# Patient Record
Sex: MALE | Race: WHITE | NOT HISPANIC OR LATINO | URBAN - METROPOLITAN AREA
[De-identification: names, ages, dates, MRNs, and addresses within clinical notes are randomized per-mention and may not be internally consistent; named-entity substitution may affect disease eponyms.]

---

## 2018-11-26 ENCOUNTER — OUTPATIENT (OUTPATIENT)
Dept: OUTPATIENT SERVICES | Facility: HOSPITAL | Age: 68
LOS: 1 days | Discharge: HOME | End: 2018-11-26
Payer: MEDICARE

## 2018-11-26 ENCOUNTER — APPOINTMENT (OUTPATIENT)
Dept: RADIATION ONCOLOGY | Facility: CLINIC | Age: 68
End: 2018-11-26
Payer: MEDICARE

## 2018-11-26 VITALS
RESPIRATION RATE: 16 BRPM | BODY MASS INDEX: 23.34 KG/M2 | WEIGHT: 181.88 LBS | HEART RATE: 74 BPM | HEIGHT: 74 IN | SYSTOLIC BLOOD PRESSURE: 143 MMHG | OXYGEN SATURATION: 98 % | DIASTOLIC BLOOD PRESSURE: 81 MMHG | TEMPERATURE: 98.78 F

## 2018-11-26 PROCEDURE — 99204 OFFICE O/P NEW MOD 45 MIN: CPT | Mod: 25

## 2018-11-26 PROCEDURE — 77263 THER RADIOLOGY TX PLNG CPLX: CPT

## 2018-12-11 ENCOUNTER — RESULT REVIEW (OUTPATIENT)
Age: 68
End: 2018-12-11

## 2018-12-11 PROCEDURE — 88321 CONSLTJ&REPRT SLD PREP ELSWR: CPT

## 2018-12-18 NOTE — PROCEDURE
[FreeTextEntry3] : 22.5mg IM on right buttock given today. Pt tolerated well. Advised to call back with any concerns and questions. Exp date 6/4/2021. Lot # [id number]

## 2019-01-16 PROCEDURE — 77301 RADIOTHERAPY DOSE PLAN IMRT: CPT | Mod: 26

## 2019-01-16 PROCEDURE — 77338 DESIGN MLC DEVICE FOR IMRT: CPT | Mod: 26

## 2019-01-16 PROCEDURE — 77300 RADIATION THERAPY DOSE PLAN: CPT | Mod: 26

## 2019-01-18 PROCEDURE — 77470 SPECIAL RADIATION TREATMENT: CPT | Mod: 26

## 2019-02-04 ENCOUNTER — OTHER (OUTPATIENT)
Age: 69
End: 2019-02-04

## 2019-02-04 PROCEDURE — 77387B: CUSTOM | Mod: 26

## 2019-02-04 NOTE — HISTORY OF PRESENT ILLNESS
[FreeTextEntry1] : Mr Mccarty  is a 67 yo male s/p RP in 2009 for adenocarcinoma of the prostate.  He [place] 1[place]29/2009 [place] [facility] [place] PSA started rising few years after surgery. Has been doing well until recent rise in PSA now to 0.76. He is being seen for salvage therapy. \par He Received first dose of Lupron 22.5mg IM on 12/21/2018\par \par He presents today for on treatment visit. He completed 180/7020cGY. He report nocturia 3-5 times per night, urgency, and occasional dribbling. Has IBS and diverticulitis, endorsed constipation. Takes Trulans daily. Patient has erection dysfunction and not sexually active. He denies bone or groin pain\par

## 2019-02-04 NOTE — REVIEW OF SYSTEMS
[Urinary Urgency: Grade 1 - Present] : Urinary Urgency: Grade 1 - Present [Urinary Frequency: Grade 1 - Present] : Urinary Frequency: Grade 1 - Present

## 2019-02-06 PROCEDURE — 77387B: CUSTOM | Mod: 26

## 2019-02-07 PROCEDURE — 77387B: CUSTOM | Mod: 26

## 2019-02-08 PROCEDURE — 77387B: CUSTOM | Mod: 26

## 2019-02-11 ENCOUNTER — OTHER (OUTPATIENT)
Age: 69
End: 2019-02-11

## 2019-02-11 PROCEDURE — 77387B: CUSTOM | Mod: 26

## 2019-02-11 PROCEDURE — 77427 RADIATION TX MANAGEMENT X5: CPT

## 2019-02-11 NOTE — HISTORY OF PRESENT ILLNESS
[FreeTextEntry1] : Mr Mccarty  is a 67 yo male s/p RP in 2009 for adenocarcinoma of the prostate.  He [place] 1[place]29/2009 [place] [facility] [place] PSA started rising few years after surgery. Has been doing well until recent rise in PSA now to 0.76. He is being seen for salvage therapy. \par He received first dose of Lupron 22.5mg IM on 12/21/2018\par \par He presents today for on treatment visit. He completed xxx/7020cGY. He report stable chronic nocturia, urgency, and dribbling. Has IBS and diverticulitis, endorsed stable, chronic constipation. Takes Trulans daily. Patient has erection dysfunction and is not sexually active. He denies bone or groin pain.\par

## 2019-02-12 PROCEDURE — 77387B: CUSTOM | Mod: 26

## 2019-02-13 PROCEDURE — 77387B: CUSTOM | Mod: 26

## 2019-02-14 PROCEDURE — 77387B: CUSTOM | Mod: 26

## 2019-02-15 PROCEDURE — 77387B: CUSTOM | Mod: 26

## 2019-02-19 PROCEDURE — 77387B: CUSTOM | Mod: 26

## 2019-02-19 PROCEDURE — 77427 RADIATION TX MANAGEMENT X5: CPT

## 2019-02-19 NOTE — HISTORY OF PRESENT ILLNESS
[FreeTextEntry1] : Mr Mccarty  is a 69 yo male s/p RP in 2009 for adenocarcinoma of the prostate.  He underwent radical prostatectomy on 1/29/2009 with Dr. Jacobson at [facility] [place]. His PSA started rising few years after surgery. Has been doing well until recent rise in PSA now to 0.76. He is being seen for salvage therapy. \par He received first dose of Lupron 22.5mg IM on 12/21/2018\par \par He presents today for on treatment visit. He completed 1800/7020cGY. He report stable fatigue, chronic nocturia, urgency, and dribbling, insomnia . Has IBS and diverticulitis, endorsed stable, chronic constipation. Takes Trulans daily. Patient has erection dysfunction and is not sexually active. He denies bone or groin pain.\par He reported severe Nausea that necessitated ER visit on  Sat to [facility], CT of the abdomen showed no obstruction as per patient and Tx with Tylenol for neck pain. Reports last colonoscopy was 3-4 yrs ago.  Will see GI Dr Bowden this Thursday. Reports HX of Hand tremors at rest getting worse, has appt with Neurology on March 15th.

## 2019-02-20 PROCEDURE — 77387B: CUSTOM | Mod: 26

## 2019-02-21 ENCOUNTER — OTHER (OUTPATIENT)
Age: 69
End: 2019-02-21

## 2019-02-21 PROCEDURE — 77387B: CUSTOM | Mod: 26

## 2019-02-22 PROCEDURE — 77387B: CUSTOM | Mod: 26

## 2019-02-25 PROCEDURE — 77387B: CUSTOM | Mod: 26

## 2019-02-25 NOTE — HISTORY OF PRESENT ILLNESS
[FreeTextEntry1] : Mr Mccarty  is a 69 yo male s/p RP in 2009 for adenocarcinoma of the prostate.  He underwent radical prostatectomy on 1/29/2009 with Dr. Jacobson at [facility] [place]. His PSA started rising few years after surgery. Has been doing well until recent rise in PSA now to 0.76. He is being seen for salvage therapy. \par He received first dose of Lupron 22.5mg IM on 12/21/2018\par \par He presents today for on treatment visit. He completed 2520/7020cGY. He report stable fatigue, chronic nocturia, and dribbling, insomnia. Patient has urinary urgency . Has IBS and diverticulitis, endorsed stable, chronic constipation. Takes Trulans daily. \par

## 2019-02-26 PROCEDURE — 77427 RADIATION TX MANAGEMENT X5: CPT

## 2019-02-26 PROCEDURE — 77387B: CUSTOM | Mod: 26

## 2019-02-27 PROCEDURE — 77387B: CUSTOM | Mod: 26

## 2019-02-28 PROCEDURE — 77387B: CUSTOM | Mod: 26

## 2019-03-01 PROCEDURE — 77387B: CUSTOM | Mod: 26

## 2019-03-04 PROCEDURE — 77387B: CUSTOM | Mod: 26

## 2019-03-04 NOTE — HISTORY OF PRESENT ILLNESS
[FreeTextEntry1] : Mr Mccarty  is a 69 yo male s/p RP in 2009 for adenocarcinoma of the prostate.  He [place] on 1/29/2009 [place] [facility] [place] His PSA started rising few years after surgery. Has been doing well until recent rise in PSA now to 0.76. He is being seen for salvage therapy. \par He received first dose of Lupron 22.5mg IM on 12/21/2018\par \par He presents today for on treatment visit. He completed 3243/7020cGY. He report stable fatigue, chronic nocturia, and dribbling, insomnia. Patient has urinary urgency . Has IBS and diverticulitis, endorsed stable, chronic constipation. Takes Trulans daily. \par

## 2019-03-05 PROCEDURE — 77387B: CUSTOM | Mod: 26

## 2019-03-05 PROCEDURE — 77427 RADIATION TX MANAGEMENT X5: CPT

## 2019-03-06 PROCEDURE — 77387B: CUSTOM | Mod: 26

## 2019-03-07 ENCOUNTER — APPOINTMENT (OUTPATIENT)
Dept: OPHTHALMOLOGY | Facility: CLINIC | Age: 69
End: 2019-03-07
Payer: MEDICARE

## 2019-03-07 DIAGNOSIS — H52.203 MYOPIA, BILATERAL: ICD-10-CM

## 2019-03-07 DIAGNOSIS — H52.4 MYOPIA, BILATERAL: ICD-10-CM

## 2019-03-07 DIAGNOSIS — H52.13 MYOPIA, BILATERAL: ICD-10-CM

## 2019-03-07 DIAGNOSIS — H25.813 COMBINED FORMS OF AGE-RELATED CATARACT, BILATERAL: ICD-10-CM

## 2019-03-07 DIAGNOSIS — H50.9 UNSPECIFIED STRABISMUS: ICD-10-CM

## 2019-03-07 DIAGNOSIS — H40.003 PREGLAUCOMA, UNSPECIFIED, BILATERAL: ICD-10-CM

## 2019-03-07 PROCEDURE — 92004 COMPRE OPH EXAM NEW PT 1/>: CPT

## 2019-03-07 PROCEDURE — 92015 DETERMINE REFRACTIVE STATE: CPT

## 2019-03-07 PROCEDURE — 92020 GONIOSCOPY: CPT

## 2019-03-07 PROCEDURE — 92133 CPTRZD OPH DX IMG PST SGM ON: CPT

## 2019-03-07 PROCEDURE — 77387B: CUSTOM | Mod: 26

## 2019-03-08 ENCOUNTER — OTHER (OUTPATIENT)
Age: 69
End: 2019-03-08

## 2019-03-08 PROBLEM — H52.13 MYOPIA OF BOTH EYES WITH ASTIGMATISM AND PRESBYOPIA: Status: ACTIVE | Noted: 2019-03-08

## 2019-03-08 PROCEDURE — 77387B: CUSTOM | Mod: 26

## 2019-03-11 PROCEDURE — 77387B: CUSTOM | Mod: 26

## 2019-03-11 NOTE — HISTORY OF PRESENT ILLNESS
[FreeTextEntry1] : Mr Mccarty  is a 67 yo male s/p RP in 2009 for adenocarcinoma of the prostate.  He underwent radical prostatectomy on 1/29/2009 with Dr. Jacobson at [facility] [place] [place]. His PSA started rising few years after surgery. Has been doing well until recent rise in PSA now to 0.76. He is being seen for salvage therapy. \par He received first dose of Lupron 22.5mg IM on 12/21/2018\par \par He presents today for on treatment visit. He completed 4320/7020cGY. He report stable fatigue, chronic nocturia, and dribbling, insomnia. Patient has urinary urgency and frequency . Has IBS and diverticulitis, endorsed stable, chronic constipation. Takes Trulans daily. \par

## 2019-03-13 PROCEDURE — 77387B: CUSTOM | Mod: 26

## 2019-03-13 PROCEDURE — 77427 RADIATION TX MANAGEMENT X5: CPT

## 2019-03-14 PROCEDURE — 77387B: CUSTOM | Mod: 26

## 2019-03-15 PROCEDURE — 77387B: CUSTOM | Mod: 26

## 2019-03-18 ENCOUNTER — APPOINTMENT (OUTPATIENT)
Dept: NEUROLOGY | Facility: CLINIC | Age: 69
End: 2019-03-18
Payer: MEDICARE

## 2019-03-18 VITALS
BODY MASS INDEX: 23.1 KG/M2 | DIASTOLIC BLOOD PRESSURE: 81 MMHG | HEART RATE: 78 BPM | WEIGHT: 180 LBS | SYSTOLIC BLOOD PRESSURE: 135 MMHG | HEIGHT: 74 IN

## 2019-03-18 DIAGNOSIS — R51 HEADACHE: ICD-10-CM

## 2019-03-18 PROCEDURE — 99205 OFFICE O/P NEW HI 60 MIN: CPT

## 2019-03-18 PROCEDURE — 77387B: CUSTOM | Mod: 26

## 2019-03-18 RX ORDER — IBUPROFEN 600 MG/1
600 TABLET, FILM COATED ORAL 3 TIMES DAILY
Qty: 90 | Refills: 0 | Status: ACTIVE | COMMUNITY
Start: 2019-03-18

## 2019-03-18 NOTE — HISTORY OF PRESENT ILLNESS
[FreeTextEntry1] : Mr Mccarty  is a 67 yo male s/p RP in 2009 for adenocarcinoma of the prostate.  He underwent radical prostatectomy on 1/29/2009 with Dr. Jacobson at [facility] [place]. His PSA started rising few years after surgery. Has been doing well until recent rise in PSA now to 0.76. He is being seen for salvage therapy. \par He received first dose of Lupron 22.5mg IM on 12/21/2018\par \par He presents today for on treatment visit. He completed 5040/7020cGY. He report stable fatigue, chronic nocturia, and dribbling, insomnia. Patient has urinary urgency and frequency . Has IBS and diverticulitis, endorsed stable, chronic constipation. Takes Trulans daily. \par

## 2019-03-18 NOTE — REVIEW OF SYSTEMS
[Urinary Frequency: Grade 1 - Present] : Urinary Frequency: Grade 1 - Present [Urinary Urgency: Grade 1 - Present] : Urinary Urgency: Grade 1 - Present

## 2019-03-19 PROCEDURE — 77387B: CUSTOM | Mod: 26

## 2019-03-20 PROCEDURE — 77427 RADIATION TX MANAGEMENT X5: CPT

## 2019-03-20 PROCEDURE — 77387B: CUSTOM | Mod: 26

## 2019-03-21 PROCEDURE — 77387B: CUSTOM | Mod: 26

## 2019-03-22 ENCOUNTER — FORM ENCOUNTER (OUTPATIENT)
Age: 69
End: 2019-03-22

## 2019-03-22 PROCEDURE — 77387B: CUSTOM | Mod: 26

## 2019-03-23 ENCOUNTER — APPOINTMENT (OUTPATIENT)
Dept: RADIOLOGY | Facility: CLINIC | Age: 69
End: 2019-03-23
Payer: MEDICARE

## 2019-03-23 ENCOUNTER — OUTPATIENT (OUTPATIENT)
Dept: OUTPATIENT SERVICES | Facility: HOSPITAL | Age: 69
LOS: 1 days | End: 2019-03-23
Payer: MEDICARE

## 2019-03-23 DIAGNOSIS — R51 HEADACHE: ICD-10-CM

## 2019-03-23 PROCEDURE — 73030 X-RAY EXAM OF SHOULDER: CPT

## 2019-03-23 PROCEDURE — 73030 X-RAY EXAM OF SHOULDER: CPT | Mod: 26,50

## 2019-03-23 PROCEDURE — 72050 X-RAY EXAM NECK SPINE 4/5VWS: CPT

## 2019-03-23 PROCEDURE — 72050 X-RAY EXAM NECK SPINE 4/5VWS: CPT | Mod: 26

## 2019-03-24 NOTE — HISTORY OF PRESENT ILLNESS
[FreeTextEntry1] : This is a 68-year-old left-handed man who has had progressively worsening neck pain over the past several years. [facility] who informed him that MRI Cervical Spine results were normal and that he should pursue physical therapy. He instead went to [facility] and saw a pain management specialist, Dr. Bernardo Degroot, who performed an epidural steroid injection, although this did not help. He saw two other pain management specialists there, but they were not able to help either. Last year, he underwent a course of physical therapy and chiropracty with Dr. Jesus Damon at [facility] as well as acupuncture, but they did not help.\par \par The pain in the neck feels intense and deep, and radiates upward along the midline of the scalp to the forehead, on a daily basis. When this occurs, the patient has difficulty moving his head to either side, and difficulty maintaining a  with his left hand. The pain is exacerbated by exercising (usually the next day) and by reading items on his cell phone or computer - sometimes exacerbated by bright lights and loud sounds. There are no relieving factors. The headaches are associated with a hissing sound in both ears, worse on the left, as well as with blurry vision with both eyes. The pain is rated at 8/10 at its worst.

## 2019-03-24 NOTE — PHYSICAL EXAM
[General Appearance - Alert] : alert [General Appearance - In No Acute Distress] : in no acute distress [General Appearance - Well Nourished] : well nourished [General Appearance - Well Developed] : well developed [Oriented To Time, Place, And Person] : oriented to person, place, and time [Impaired Insight] : insight and judgment were intact [Affect] : the affect was normal [Person] : oriented to person [Place] : oriented to place [Time] : oriented to time [Concentration Intact] : normal concentrating ability [Naming Objects] : no difficulty naming common objects [Repeating Phrases] : no difficulty repeating a phrase [Fluency] : fluency intact [Comprehension] : comprehension intact [Cranial Nerves Optic (II)] : visual acuity intact bilaterally,  visual fields full to confrontation, pupils equal round and reactive to light [Cranial Nerves Oculomotor (III)] : extraocular motion intact [Cranial Nerves Trigeminal (V)] : facial sensation intact symmetrically [Cranial Nerves Facial (VII)] : face symmetrical [Cranial Nerves Vestibulocochlear (VIII)] : hearing was intact bilaterally [Cranial Nerves Glossopharyngeal (IX)] : tongue and palate midline [Cranial Nerves Hypoglossal (XII)] : there was no tongue deviation with protrusion [CNS Accessory - Diminished Shoulder Elev. - Right Trapezius] : weakness of shoulder elevation was present on the right [CNS Accessory - Diminished Shoulder Elev. - Left Trapezius] : no weakness of shoulder elevation on the left [CNS Accessory - Sternocleidomastoid Weakness Right Only] : sternocleidomastoid weakness was present on the right [CNS Accessory - Sternocleidomastoid Weakness Left Only] : no sternocleidomastoid weakness on the left [Motor Tone] : muscle tone was normal in all four extremities [Motor Strength] : muscle strength was normal in all four extremities [Involuntary Movements] : no involuntary movements were seen [No Muscle Atrophy] : normal bulk in all four extremities [Motor Handedness Right-Handed] : the patient is right hand dominant [Paresis Pronator Drift Right-Sided] : no pronator drift on the right [Paresis Pronator Drift Left-Sided] : no pronator drift on the left [Motor Strength Upper Extremities Bilaterally] : strength was normal in both upper extremities [Motor Strength Lower Extremities Bilaterally] : strength was normal in both lower extremities [Sensation Tactile Decrease] : light touch was intact [Sensation Pain / Temperature Decrease] : pain and temperature was intact [Romberg's Sign] : Romberg's sign was negtive [Allodynia] : no ~T allodynia present [Balance] : balance was intact [Past-pointing] : there was no past-pointing [Tremor] : no tremor present [Dysdiadochokinesia Bilaterally] : not present [Coordination - Dysmetria Impaired Finger-to-Nose Bilateral] : not present [Coordination - Dysmetria Impaired Heel-to-Shin Bilateral] : not present [2+] : Ankle jerk left 2+ [Plantar Reflex Right Only] : normal on the right [Plantar Reflex Left Only] : normal on the left [___] : absent on the right [___] : absent on the left [FreeTextEntry8] : Normal, narrow-based gait. No difficulty with tiptoe, heel, and tandem gaits. [Sclera] : the sclera and conjunctiva were normal [PERRL With Normal Accommodation] : pupils were equal in size, round, reactive to light, with normal accommodation [Extraocular Movements] : extraocular movements were intact [Outer Ear] : the ears and nose were normal in appearance [Oropharynx] : the oropharynx was normal [Neck Appearance] : the appearance of the neck was normal [Auscultation Breath Sounds / Voice Sounds] : lungs were clear to auscultation bilaterally [Heart Rate And Rhythm] : heart rate was normal and rhythm regular [Heart Sounds] : normal S1 and S2 [Murmurs] : no murmurs [Arterial Pulses Carotid] : carotid pulses were normal with no bruits [Bowel Sounds] : normal bowel sounds [Abdomen Soft] : soft [Abdomen Tenderness] : non-tender [No CVA Tenderness] : no ~M costovertebral angle tenderness [FreeTextEntry1] : Mild tenderness to palpation at cervical spine, but not more distally [Abnormal Walk] : normal gait [Nail Clubbing] : no clubbing  or cyanosis of the fingernails [Skin Color & Pigmentation] : normal skin color and pigmentation [Skin Turgor] : normal skin turgor [] : no rash

## 2019-03-24 NOTE — REVIEW OF SYSTEMS
[Neck Pain] : neck pain [As Noted in HPI] : as noted in HPI [Negative] : Heme/Lymph [FreeTextEntry3] : Blurry vision associated with some headaches [FreeTextEntry4] : Hissing sound associated with some headaches

## 2019-03-25 PROCEDURE — 77387B: CUSTOM | Mod: 26

## 2019-03-26 PROCEDURE — 77387B: CUSTOM | Mod: 26

## 2019-03-27 ENCOUNTER — APPOINTMENT (OUTPATIENT)
Dept: OPHTHALMOLOGY | Facility: CLINIC | Age: 69
End: 2019-03-27

## 2019-03-27 PROCEDURE — 77387B: CUSTOM | Mod: 26

## 2019-03-28 PROCEDURE — 77387B: CUSTOM | Mod: 26

## 2019-03-29 PROCEDURE — 77387B: CUSTOM | Mod: 26

## 2019-04-01 PROCEDURE — 77387B: CUSTOM | Mod: 26

## 2019-04-02 PROCEDURE — 77387B: CUSTOM | Mod: 26

## 2019-04-02 PROCEDURE — 77427 RADIATION TX MANAGEMENT X5: CPT

## 2019-04-02 NOTE — HISTORY OF PRESENT ILLNESS
[FreeTextEntry1] : Mr Mccarty  is a 67 yo male s/p RP in 2009 for adenocarcinoma of the prostate.  He underwent radical prostatectomy on 1/29/2009 with Dr. Jacobson at [facility] [place] [facility] [place]. His PSA started rising few years after surgery. Has been doing well until recent rise in PSA now to 0.76. He is being seen for salvage therapy. \par He received first dose of Lupron 22.5mg IM on 12/21/2018\par \par He presents today for his last treatment visit. He completed 7020/7020cGY. He report stable fatigue, chronic nocturia, and dribbling, insomnia. Patient has stable urinary urgency and frequency . Taking Flomax. \par

## 2019-05-02 ENCOUNTER — APPOINTMENT (OUTPATIENT)
Dept: RADIATION ONCOLOGY | Facility: CLINIC | Age: 69
End: 2019-05-02
Payer: MEDICARE

## 2019-05-02 VITALS
TEMPERATURE: 98.06 F | DIASTOLIC BLOOD PRESSURE: 80 MMHG | HEART RATE: 80 BPM | WEIGHT: 188.05 LBS | OXYGEN SATURATION: 98 % | BODY MASS INDEX: 24.13 KG/M2 | HEIGHT: 74 IN | RESPIRATION RATE: 16 BRPM | SYSTOLIC BLOOD PRESSURE: 127 MMHG

## 2019-05-02 PROCEDURE — 99024 POSTOP FOLLOW-UP VISIT: CPT

## 2019-05-02 NOTE — HISTORY OF PRESENT ILLNESS
[FreeTextEntry1] : Mr. Mccarty is a 68 years old male with adenocarcinoma of the prostate. He is s/p radical prostatectomy on 1/29/2009 with Dr. Jacobson at [facility]. His PSA started rising few years after surgery. He was treated with salvage radiation therapy to the prostate bed to a dose of 7020 cGy between 2/4/19 - 4/2/19. Patient started and completed radiation treatment well. He did not develop any grade 3 or higher acute toxicity or taking any unscheduled treatment break.\par \par He present here today for post treatment follow up. Patient report nocturia 5-6 times per night , occasional dribbling and urgency. He denies dysuria, hematuria and hesitancy. Has occasional constipation. Patient is not sexually active. Patient stated he feel depressed. He is being followed by our .

## 2019-05-02 NOTE — REVIEW OF SYSTEMS
[Negative] : Neurological [Nocturia] : nocturia [IPSS Score (0-40): ___] : IPSS score: [unfilled] [EPIC-CP Score (0-60): ___] : EPIC-CP score: [unfilled] [Hematuria: Grade 0] : Hematuria: Grade 0 [Urinary Incontinence: Grade 1 - Occasional (e.g., with coughing, sneezing, etc.), pads not indicated] : Urinary Incontinence: Grade 1 - Occasional (e.g., with coughing, sneezing, etc.), pads not indicated [Urinary Tract Pain: Grade 0] : Urinary Tract Pain: Grade 0 [Urinary Retention: Grade 0] : Urinary Retention: Grade 0 [Urinary Urgency: Grade 1 - Present] : Urinary Urgency: Grade 1 - Present [Urinary Frequency: Grade 0] : Urinary Frequency: Grade 0 [Ejaculation Disorder: Grade 1 - Diminished ejaculation] : Ejaculation Disorder: Grade 1 - Diminished ejaculation  [Erectile Dysfunction: Grade 1 - Decrease in erectile function (frequency or rigidity of erections) but intervention not indicated (e.g., medication or use of mechanical device, penile pump)] : Erectile Dysfunction: Grade 1 - Decrease in erectile function (frequency or rigidity of erections) but intervention not indicated (e.g., medication or use of mechanical device, penile pump) [Fatigue] : fatigue [Depression] : depression [Hot Flashes] : hot flashes [Urinary Frequency] : no urinary frequency [Suicidal] : not suicidal [Anxiety] : no anxiety [FreeTextEntry2] : occasional dibbling

## 2019-05-02 NOTE — PHYSICAL EXAM
[Normal] : oriented to person, place and time, the affect was normal, the mood was normal and not anxious [Absent] : was absent

## 2019-05-09 ENCOUNTER — MOBILE ON CALL (OUTPATIENT)
Age: 69
End: 2019-05-09

## 2019-05-09 RX ORDER — AMITRIPTYLINE HYDROCHLORIDE 10 MG/1
10 TABLET, FILM COATED ORAL
Qty: 42 | Refills: 2 | Status: DISCONTINUED | COMMUNITY
Start: 2019-03-18 | End: 2019-05-09

## 2019-05-13 ENCOUNTER — RX RENEWAL (OUTPATIENT)
Age: 69
End: 2019-05-13

## 2019-05-13 ENCOUNTER — MOBILE ON CALL (OUTPATIENT)
Age: 69
End: 2019-05-13

## 2019-05-21 ENCOUNTER — APPOINTMENT (OUTPATIENT)
Dept: NEUROLOGY | Facility: CLINIC | Age: 69
End: 2019-05-21
Payer: MEDICARE

## 2019-05-21 VITALS
WEIGHT: 182 LBS | HEIGHT: 74 IN | SYSTOLIC BLOOD PRESSURE: 115 MMHG | BODY MASS INDEX: 23.36 KG/M2 | DIASTOLIC BLOOD PRESSURE: 71 MMHG | HEART RATE: 79 BPM

## 2019-05-21 PROCEDURE — 99215 OFFICE O/P EST HI 40 MIN: CPT

## 2019-05-21 NOTE — REVIEW OF SYSTEMS
[As Noted in HPI] : as noted in HPI [Anxiety] : anxiety [Negative] : Heme/Lymph [de-identified] : Heat intolerance episodes present

## 2019-05-21 NOTE — PHYSICAL EXAM
[General Appearance - Alert] : alert [General Appearance - In No Acute Distress] : in no acute distress [General Appearance - Well Nourished] : well nourished [General Appearance - Well Developed] : well developed [Oriented To Time, Place, And Person] : oriented to person, place, and time [Impaired Insight] : insight and judgment were intact [Person] : oriented to person [Place] : oriented to place [Time] : oriented to time [Visual Intact] : visual attention was ~T not ~L decreased [Naming Objects] : no difficulty naming common objects [Repeating Phrases] : no difficulty repeating a phrase [Fluency] : fluency intact [Comprehension] : comprehension intact [Cranial Nerves Optic (II)] : visual acuity intact bilaterally,  visual fields full to confrontation, pupils equal round and reactive to light [Cranial Nerves Oculomotor (III)] : extraocular motion intact [Cranial Nerves Trigeminal (V)] : facial sensation intact symmetrically [Cranial Nerves Facial (VII)] : face symmetrical [Cranial Nerves Vestibulocochlear (VIII)] : hearing was intact bilaterally [Cranial Nerves Glossopharyngeal (IX)] : tongue and palate midline [Cranial Nerves Hypoglossal (XII)] : there was no tongue deviation with protrusion [CNS Accessory - Diminished Shoulder Elev. - Right Trapezius] : weakness of shoulder elevation was present on the right [CNS Accessory - Sternocleidomastoid Weakness Right Only] : sternocleidomastoid weakness was present on the right [Motor Tone] : muscle tone was normal in all four extremities [Motor Strength] : muscle strength was normal in all four extremities [Involuntary Movements] : no involuntary movements were seen [No Muscle Atrophy] : normal bulk in all four extremities [Motor Handedness Right-Handed] : the patient is right hand dominant [Sensation Tactile Decrease] : light touch was intact [Sensation Pain / Temperature Decrease] : pain and temperature was intact [Balance] : balance was intact [2+] : Ankle jerk left 2+ [Sclera] : the sclera and conjunctiva were normal [PERRL With Normal Accommodation] : pupils were equal in size, round, reactive to light, with normal accommodation [Extraocular Movements] : extraocular movements were intact [Outer Ear] : the ears and nose were normal in appearance [Oropharynx] : the oropharynx was normal [Neck Appearance] : the appearance of the neck was normal [Auscultation Breath Sounds / Voice Sounds] : lungs were clear to auscultation bilaterally [Heart Rate And Rhythm] : heart rate was normal and rhythm regular [Heart Sounds] : normal S1 and S2 [Murmurs] : no murmurs [Arterial Pulses Carotid] : carotid pulses were normal with no bruits [Full Pulse] : the pedal pulses are present [Edema] : there was no peripheral edema [Bowel Sounds] : normal bowel sounds [Abdomen Soft] : soft [Abdomen Tenderness] : non-tender [No CVA Tenderness] : no ~M costovertebral angle tenderness [Abnormal Walk] : normal gait [Nail Clubbing] : no clubbing  or cyanosis of the fingernails [Skin Color & Pigmentation] : normal skin color and pigmentation [Skin Turgor] : normal skin turgor [] : no rash [CNS Accessory - Diminished Shoulder Elev. - Left Trapezius] : no weakness of shoulder elevation on the left [CNS Accessory - Sternocleidomastoid Weakness Left Only] : no sternocleidomastoid weakness on the left [Paresis Pronator Drift Right-Sided] : no pronator drift on the right [Paresis Pronator Drift Left-Sided] : no pronator drift on the left [Motor Strength Upper Extremities Bilaterally] : strength was normal in both upper extremities [Motor Strength Lower Extremities Bilaterally] : strength was normal in both lower extremities [Romberg's Sign] : Romberg's sign was negtive [Allodynia] : no ~T allodynia present [Past-pointing] : there was no past-pointing [Tremor] : no tremor present [Dysdiadochokinesia Bilaterally] : not present [Coordination - Dysmetria Impaired Finger-to-Nose Bilateral] : not present [Coordination - Dysmetria Impaired Heel-to-Shin Bilateral] : not present [Plantar Reflex Right Only] : normal on the right [Plantar Reflex Left Only] : normal on the left [___] : absent on the right [___] : absent on the left [FreeTextEntry7] : Sensory deficit to light touch and pinprick present at b/l aspects of upper back, just proximal to the b/l shoulders [FreeTextEntry8] : Normal, narrow-based gait. No difficulty with tiptoe, heel, and tandem gaits. [FreeTextEntry1] : Tenderness to palpation present at cervical spine

## 2019-05-21 NOTE — ASSESSMENT
[FreeTextEntry1] : 68 LHM with chronic neck pain and cervicogenic headache, now with migrainous features, with poor response to existing medical and physical therapy.

## 2019-05-21 NOTE — HISTORY OF PRESENT ILLNESS
[FreeTextEntry1] : This is a 68-year-old left-handed man with progressively worsening neck pain over the past several years, previously [facility] by [facility] and last seen by me in this clinic on March 18, 2019. The patient is seen today for urgent follow-up because of worsening of neck pain, as well as a 2-month history of developing headaches starting at the back of the head and radiating up and over to the forehead, associated with photophobia and phonophobia, making it difficult for him to drive during the day and view his computer screen. The pain level improves with the use of Excedrin, but only for a couple of hours at a time. The neck pain can still flare up into a vise-like pain, rated 8/10, 2-3 times per week. The physical therapy course that I prescribed has not seemed to help much. I prescribed him a Medrol Dose Pack 2 weeks ago, which he completed yesterday, but this has also not appeared to help much.

## 2019-06-19 ENCOUNTER — FORM ENCOUNTER (OUTPATIENT)
Age: 69
End: 2019-06-19

## 2019-06-20 ENCOUNTER — OUTPATIENT (OUTPATIENT)
Dept: OUTPATIENT SERVICES | Facility: HOSPITAL | Age: 69
LOS: 1 days | End: 2019-06-20
Payer: MEDICARE

## 2019-06-20 ENCOUNTER — APPOINTMENT (OUTPATIENT)
Dept: MRI IMAGING | Facility: CLINIC | Age: 69
End: 2019-06-20
Payer: MEDICARE

## 2019-06-20 DIAGNOSIS — Z00.8 ENCOUNTER FOR OTHER GENERAL EXAMINATION: ICD-10-CM

## 2019-06-20 PROCEDURE — 72156 MRI NECK SPINE W/O & W/DYE: CPT | Mod: 26

## 2019-06-20 PROCEDURE — A9585: CPT

## 2019-06-20 PROCEDURE — 72156 MRI NECK SPINE W/O & W/DYE: CPT

## 2019-06-25 ENCOUNTER — APPOINTMENT (OUTPATIENT)
Dept: NEUROLOGY | Facility: CLINIC | Age: 69
End: 2019-06-25

## 2019-07-15 LAB — PSA SERPL-MCNC: <0.01 NG/ML

## 2019-08-19 ENCOUNTER — NON-APPOINTMENT (OUTPATIENT)
Age: 69
End: 2019-08-19

## 2019-08-19 ENCOUNTER — APPOINTMENT (OUTPATIENT)
Age: 69
End: 2019-08-19
Payer: MEDICARE

## 2019-08-19 PROCEDURE — 92014 COMPRE OPH EXAM EST PT 1/>: CPT

## 2019-08-19 PROCEDURE — 92083 EXTENDED VISUAL FIELD XM: CPT

## 2019-08-19 PROCEDURE — 92136 OPHTHALMIC BIOMETRY: CPT

## 2019-08-19 PROCEDURE — 92020 GONIOSCOPY: CPT

## 2019-08-26 LAB
ALBUMIN SERPL ELPH-MCNC: 4.8 G/DL
ALP BLD-CCNC: 52 U/L
ALT SERPL-CCNC: 17 U/L
ANION GAP SERPL CALC-SCNC: 14 MMOL/L
AST SERPL-CCNC: 17 U/L
BASOPHILS # BLD AUTO: 0.06 K/UL
BASOPHILS NFR BLD AUTO: 0.9 %
BILIRUB SERPL-MCNC: 0.4 MG/DL
BUN SERPL-MCNC: 9 MG/DL
CALCIUM SERPL-MCNC: 9.9 MG/DL
CHLORIDE SERPL-SCNC: 101 MMOL/L
CO2 SERPL-SCNC: 22 MMOL/L
CREAT SERPL-MCNC: 0.77 MG/DL
CRP SERPL-MCNC: <0.1 MG/DL
EOSINOPHIL # BLD AUTO: 0.14 K/UL
EOSINOPHIL NFR BLD AUTO: 2.1 %
ERYTHROCYTE [SEDIMENTATION RATE] IN BLOOD BY WESTERGREN METHOD: 7 MM/HR
GLUCOSE SERPL-MCNC: 113 MG/DL
HCT VFR BLD CALC: 36.6 %
HGB BLD-MCNC: 11.8 G/DL
IMM GRANULOCYTES NFR BLD AUTO: 1.2 %
LYMPHOCYTES # BLD AUTO: 0.84 K/UL
LYMPHOCYTES NFR BLD AUTO: 12.8 %
MAGNESIUM SERPL-MCNC: 1.9 MG/DL
MAN DIFF?: NORMAL
MCHC RBC-ENTMCNC: 30.5 PG
MCHC RBC-ENTMCNC: 32.2 GM/DL
MCV RBC AUTO: 94.6 FL
MONOCYTES # BLD AUTO: 0.66 K/UL
MONOCYTES NFR BLD AUTO: 10 %
NEUTROPHILS # BLD AUTO: 4.8 K/UL
NEUTROPHILS NFR BLD AUTO: 73 %
PHOSPHATE SERPL-MCNC: 3 MG/DL
PLATELET # BLD AUTO: 260 K/UL
POTASSIUM SERPL-SCNC: 4.3 MMOL/L
PROT SERPL-MCNC: 6.9 G/DL
RBC # BLD: 3.87 M/UL
RBC # FLD: 12.8 %
SODIUM SERPL-SCNC: 137 MMOL/L
WBC # FLD AUTO: 6.58 K/UL

## 2019-08-28 ENCOUNTER — NON-APPOINTMENT (OUTPATIENT)
Age: 69
End: 2019-08-28

## 2019-08-28 ENCOUNTER — APPOINTMENT (OUTPATIENT)
Dept: OPHTHALMOLOGY | Facility: CLINIC | Age: 69
End: 2019-08-28
Payer: MEDICARE

## 2019-08-28 PROCEDURE — 92060 SENSORIMOTOR EXAMINATION: CPT

## 2019-08-28 PROCEDURE — 92014 COMPRE OPH EXAM EST PT 1/>: CPT

## 2019-08-28 PROCEDURE — 92083 EXTENDED VISUAL FIELD XM: CPT

## 2019-08-30 ENCOUNTER — MOBILE ON CALL (OUTPATIENT)
Age: 69
End: 2019-08-30

## 2019-09-05 ENCOUNTER — APPOINTMENT (OUTPATIENT)
Dept: OPHTHALMOLOGY | Facility: CLINIC | Age: 69
End: 2019-09-05

## 2019-09-09 ENCOUNTER — OUTPATIENT (OUTPATIENT)
Dept: OUTPATIENT SERVICES | Facility: HOSPITAL | Age: 69
LOS: 1 days | End: 2019-09-09
Payer: MEDICARE

## 2019-09-09 ENCOUNTER — APPOINTMENT (OUTPATIENT)
Dept: MRI IMAGING | Facility: CLINIC | Age: 69
End: 2019-09-09
Payer: MEDICARE

## 2019-09-09 DIAGNOSIS — Z00.8 ENCOUNTER FOR OTHER GENERAL EXAMINATION: ICD-10-CM

## 2019-09-09 PROCEDURE — A9585: CPT

## 2019-09-09 PROCEDURE — 70553 MRI BRAIN STEM W/O & W/DYE: CPT | Mod: 26

## 2019-09-09 PROCEDURE — 70553 MRI BRAIN STEM W/O & W/DYE: CPT

## 2019-09-09 NOTE — PHYSICAL EXAM
[Absent] : was absent [Normal] : oriented to person, place and time, the affect was normal, the mood was normal and not anxious

## 2019-09-12 ENCOUNTER — APPOINTMENT (OUTPATIENT)
Dept: RADIATION ONCOLOGY | Facility: CLINIC | Age: 69
End: 2019-09-12

## 2019-09-13 ENCOUNTER — NON-APPOINTMENT (OUTPATIENT)
Age: 69
End: 2019-09-13

## 2019-09-13 ENCOUNTER — APPOINTMENT (OUTPATIENT)
Dept: OPHTHALMOLOGY | Facility: CLINIC | Age: 69
End: 2019-09-13
Payer: MEDICARE

## 2019-09-13 PROCEDURE — 92012 INTRM OPH EXAM EST PATIENT: CPT

## 2019-09-17 NOTE — HISTORY OF PRESENT ILLNESS
[FreeTextEntry1] : Mr. Mccarty is a 68 years old male with adenocarcinoma of the prostate. He is [place] 1[place]29/2009 [place] [facility] [place] PSA started rising few years after surgery. He was treated with salvage radiation therapy to the prostate bed to a dose of 7020 cGy between 2/4/19 - 4/2/19. Patient started and completed radiation treatment well. He did not develop any grade 3 or higher acute toxicity or taking any unscheduled treatment break.\par \par At his last visit, Patient report nocturia 5-6 times per night , occasional dribbling and urgency. He denies dysuria, hematuria and hesitancy. Has occasional constipation. Patient is not sexually active. Patient stated he feel depressed. He is being followed by our .\par \par He presents for follow up today.

## 2019-09-17 NOTE — DISEASE MANAGEMENT
[c] : c [1] : T1 [0] : M0 [N/A] : Currently not applicable [Radical Prostatectomy] : Radical Prostatectomy [Radiation Therapy] : Radiation  Therapy [Treatment with radiation therapy] : Treatment with radiation therapy [EBRT] : EBRT [RadiationCompletedDate] : 4/2/19 [RadicalProstatectomyDate] : 1/29/2009 [EBRTDose] : [id number] [EBRTFractions] : 39

## 2019-09-17 NOTE — REVIEW OF SYSTEMS
[Fatigue] : fatigue [IPSS Score (0-40): ___] : IPSS score: [unfilled] [EPIC-CP Score (0-60): ___] : EPIC-CP score: [unfilled] [Depression] : depression [Hot Flashes] : hot flashes [Negative] : Neurological [Urinary Incontinence: Grade 1 - Occasional (e.g., with coughing, sneezing, etc.), pads not indicated] : Urinary Incontinence: Grade 1 - Occasional (e.g., with coughing, sneezing, etc.), pads not indicated [Hematuria: Grade 0] : Hematuria: Grade 0 [Urinary Tract Pain: Grade 0] : Urinary Tract Pain: Grade 0 [Urinary Retention: Grade 0] : Urinary Retention: Grade 0 [Urinary Frequency: Grade 0] : Urinary Frequency: Grade 0 [Urinary Urgency: Grade 1 - Present] : Urinary Urgency: Grade 1 - Present [Ejaculation Disorder: Grade 1 - Diminished ejaculation] : Ejaculation Disorder: Grade 1 - Diminished ejaculation  [Erectile Dysfunction: Grade 1 - Decrease in erectile function (frequency or rigidity of erections) but intervention not indicated (e.g., medication or use of mechanical device, penile pump)] : Erectile Dysfunction: Grade 1 - Decrease in erectile function (frequency or rigidity of erections) but intervention not indicated (e.g., medication or use of mechanical device, penile pump) [Suicidal] : not suicidal [Anxiety] : no anxiety [FreeTextEntry2] : occasional dibbling

## 2019-09-24 ENCOUNTER — APPOINTMENT (OUTPATIENT)
Dept: PAIN MANAGEMENT | Facility: CLINIC | Age: 69
End: 2019-09-24

## 2019-09-26 ENCOUNTER — APPOINTMENT (OUTPATIENT)
Dept: RADIATION ONCOLOGY | Facility: CLINIC | Age: 69
End: 2019-09-26
Payer: MEDICARE

## 2019-09-27 LAB — PSA SERPL-MCNC: <0.01 NG/ML

## 2019-10-10 ENCOUNTER — APPOINTMENT (OUTPATIENT)
Dept: RADIATION ONCOLOGY | Facility: CLINIC | Age: 69
End: 2019-10-10
Payer: MEDICARE

## 2019-10-10 VITALS
HEART RATE: 72 BPM | HEIGHT: 74 IN | RESPIRATION RATE: 16 BRPM | SYSTOLIC BLOOD PRESSURE: 122 MMHG | WEIGHT: 179.22 LBS | BODY MASS INDEX: 23 KG/M2 | DIASTOLIC BLOOD PRESSURE: 73 MMHG | OXYGEN SATURATION: 96 % | TEMPERATURE: 97.88 F

## 2019-10-10 PROCEDURE — 99213 OFFICE O/P EST LOW 20 MIN: CPT

## 2019-10-10 NOTE — DISEASE MANAGEMENT
[1] : T1 [c] : c [N/A] : Currently not applicable [0] : M0 [Radiation Therapy] : Radiation  Therapy [Radical Prostatectomy] : Radical Prostatectomy [EBRT] : EBRT [Treatment with radiation therapy] : Treatment with radiation therapy [RadicalProstatectomyDate] : 1/29/2009 [EBRTDose] : 7046 [RadiationCompletedDate] : 4/2/19 [EBRTFractions] : 39

## 2019-10-10 NOTE — REVIEW OF SYSTEMS
[Fatigue] : fatigue [Negative] : Respiratory [Hematuria: Grade 0] : Hematuria: Grade 0 [Urinary Retention: Grade 0] : Urinary Retention: Grade 0 [Urinary Incontinence: Grade 1 - Occasional (e.g., with coughing, sneezing, etc.), pads not indicated] : Urinary Incontinence: Grade 1 - Occasional (e.g., with coughing, sneezing, etc.), pads not indicated [Urinary Tract Pain: Grade 0] : Urinary Tract Pain: Grade 0 [Urinary Urgency: Grade 1 - Present] : Urinary Urgency: Grade 1 - Present [Ejaculation Disorder: Grade 1 - Diminished ejaculation] : Ejaculation Disorder: Grade 1 - Diminished ejaculation  [Erectile Dysfunction: Grade 1 - Decrease in erectile function (frequency or rigidity of erections) but intervention not indicated (e.g., medication or use of mechanical device, penile pump)] : Erectile Dysfunction: Grade 1 - Decrease in erectile function (frequency or rigidity of erections) but intervention not indicated (e.g., medication or use of mechanical device, penile pump) [Urinary Frequency: Grade 0] : Urinary Frequency: Grade 0 [Nocturia] : nocturia [IPSS Score (0-40): ___] : IPSS score: [unfilled] [EPIC-CP Score (0-60): ___] : EPIC-CP score: [unfilled] [Suicidal] : not suicidal [Urinary Frequency] : no urinary frequency [Anxiety] : no anxiety [FreeTextEntry2] : occasional dibbling

## 2019-10-10 NOTE — HISTORY OF PRESENT ILLNESS
[FreeTextEntry1] : Mr. Mccarty is a 68 years old male with adenocarcinoma of the prostate. He [place] 1[place]29/2009 [place] [facility] [place] PSA started rising few years after surgery. He was treated with salvage radiation therapy to the prostate bed to a dose of 7020 cGy between 2/4/19 - 4/2/19. Patient started and completed radiation treatment well. He did not develop any grade 3 or higher acute toxicity or taking any unscheduled treatment break.\par \par He present here today for follow up. His recent PSA is <0.01 ng/ml done on 9/26/19. Patient report nocturia 3-4 times per night, urgency and occasional dribbling. does not wear pad. He has chronic constipation, diagnosed with diverticulosis. following Gastro. Patient is not sexually active.

## 2019-10-18 ENCOUNTER — APPOINTMENT (OUTPATIENT)
Dept: OPHTHALMOLOGY | Facility: CLINIC | Age: 69
End: 2019-10-18

## 2020-02-13 ENCOUNTER — NON-APPOINTMENT (OUTPATIENT)
Age: 70
End: 2020-02-13

## 2020-02-13 ENCOUNTER — APPOINTMENT (OUTPATIENT)
Age: 70
End: 2020-02-13
Payer: MEDICARE

## 2020-02-13 PROCEDURE — 92133 CPTRZD OPH DX IMG PST SGM ON: CPT

## 2020-02-13 PROCEDURE — 92083 EXTENDED VISUAL FIELD XM: CPT

## 2020-02-13 PROCEDURE — 92012 INTRM OPH EXAM EST PATIENT: CPT

## 2020-04-02 ENCOUNTER — APPOINTMENT (OUTPATIENT)
Dept: NEUROLOGY | Facility: CLINIC | Age: 70
End: 2020-04-02
Payer: MEDICARE

## 2020-04-02 PROCEDURE — 99443: CPT

## 2020-04-03 ENCOUNTER — APPOINTMENT (OUTPATIENT)
Dept: NEUROLOGY | Facility: CLINIC | Age: 70
End: 2020-04-03

## 2020-06-11 ENCOUNTER — APPOINTMENT (OUTPATIENT)
Dept: NEUROLOGY | Facility: CLINIC | Age: 70
End: 2020-06-11
Payer: MEDICARE

## 2020-06-11 VITALS — HEART RATE: 94 BPM | SYSTOLIC BLOOD PRESSURE: 136 MMHG | DIASTOLIC BLOOD PRESSURE: 90 MMHG

## 2020-06-11 VITALS
HEART RATE: 88 BPM | DIASTOLIC BLOOD PRESSURE: 77 MMHG | SYSTOLIC BLOOD PRESSURE: 129 MMHG | HEIGHT: 70 IN | BODY MASS INDEX: 25.77 KG/M2 | WEIGHT: 180 LBS

## 2020-06-11 VITALS — TEMPERATURE: 209.12 F

## 2020-06-11 DIAGNOSIS — R20.0 ANESTHESIA OF SKIN: ICD-10-CM

## 2020-06-11 PROCEDURE — 99215 OFFICE O/P EST HI 40 MIN: CPT

## 2020-06-11 RX ORDER — LEUPROLIDE ACETATE 22.5 MG
22.5 KIT INTRAMUSCULAR
Qty: 1 | Refills: 0 | Status: DISCONTINUED | COMMUNITY
Start: 2018-11-26 | End: 2020-06-11

## 2020-06-11 RX ORDER — METHYLPREDNISOLONE 4 MG/1
4 TABLET ORAL
Qty: 1 | Refills: 0 | Status: DISCONTINUED | COMMUNITY
Start: 2019-05-09 | End: 2020-06-11

## 2020-06-11 RX ORDER — ASPIRIN/ACETAMINOPHEN/CAFFEINE 250-250-65
250-250-65 TABLET ORAL 4 TIMES DAILY
Qty: 120 | Refills: 0 | Status: ACTIVE | COMMUNITY
Start: 2020-06-11

## 2020-06-11 RX ORDER — TAMSULOSIN HYDROCHLORIDE 0.4 MG/1
0.4 CAPSULE ORAL
Qty: 90 | Refills: 3 | Status: DISCONTINUED | COMMUNITY
Start: 2019-03-11 | End: 2020-06-11

## 2020-06-11 RX ORDER — LEUPROLIDE ACETATE 22.5 MG
22.5 KIT INTRAMUSCULAR
Qty: 1 | Refills: 0 | Status: DISCONTINUED | COMMUNITY
Start: 2018-12-10 | End: 2020-06-11

## 2020-06-11 NOTE — ASSESSMENT
[FreeTextEntry1] : 69 LHM with chronic neck pain, posterior headache, and sensory deficit affecting left face, which may represent\par cervicogenic headache vs. migraine with cervical radiculopathy vs. syrinx given neuroimaging findings, with insufficient response to amitriptyline, methylprednisolone, oxycodone, tramadol, and physical therapy.

## 2020-06-11 NOTE — HISTORY OF PRESENT ILLNESS
[FreeTextEntry1] : FROM 5/21/19:\par This is a 68-year-old left-handed man with progressively worsening neck pain over the past several years, [facility] and last seen by me in this clinic on March 18, 2019. The patient is seen today for urgent follow-up because of worsening of neck pain, as well as a 2-month history of developing headaches starting at the back of the head and radiating up and over to the forehead, associated with photophobia and phonophobia, making it difficult for him to drive during the day and view his computer screen. The pain level improves with the use of Excedrin, but only for a couple of hours at a time. The neck pain can still flare up into a vise-like pain, rated 8/10, 2-3 times per week. The physical therapy course that I prescribed has not seemed to help much. I prescribed him a Medrol Dose Pack 2 weeks ago, which he completed yesterday, but this has also not appeared to help much.\par \par FROM 6/11/20:\par The patient, now 69, states that he continues to have persistent neck pain, only minimally relieved with amitriptyline 50mg PO QHS and tramadol 50mg PO BID PRN pain. He and his wife have also noticed over the past several months that the patient has tremors of the head and of the left arm, especially when trying to do activities, and this can interfere with the patient's ability to hold onto items with his left hand. He has noticed over the past 2 months that he has become more sensitive to light and smells.

## 2020-06-11 NOTE — PHYSICAL EXAM
[General Appearance - Alert] : alert [General Appearance - Well Nourished] : well nourished [Oriented To Time, Place, And Person] : oriented to person, place, and time [Person] : oriented to person [Visual Intact] : visual attention was ~T not ~L decreased [Time] : oriented to time [Place] : oriented to place [Fluency] : fluency intact [Repeating Phrases] : no difficulty repeating a phrase [Naming Objects] : no difficulty naming common objects [Cranial Nerves Optic (II)] : visual acuity intact bilaterally,  visual fields full to confrontation, pupils equal round and reactive to light [Comprehension] : comprehension intact [Cranial Nerves Oculomotor (III)] : extraocular motion intact [Cranial Nerves Vestibulocochlear (VIII)] : hearing was intact bilaterally [Cranial Nerves Facial (VII)] : face symmetrical [Cranial Nerves Glossopharyngeal (IX)] : tongue and palate midline [Cranial Nerves Hypoglossal (XII)] : there was no tongue deviation with protrusion [CNS Accessory - Diminished Shoulder Elev. - Right Trapezius] : weakness of shoulder elevation was present on the right [CNS Accessory - Sternocleidomastoid Weakness Right Only] : sternocleidomastoid weakness was present on the right [Motor Tone] : muscle tone was normal in all four extremities [Involuntary Movements] : no involuntary movements were seen [Motor Strength] : muscle strength was normal in all four extremities [No Muscle Atrophy] : normal bulk in all four extremities [Motor Handedness Right-Handed] : the patient is right hand dominant [Sensation Tactile Decrease] : light touch was intact [Sensation Pain / Temperature Decrease] : pain and temperature was intact [Balance] : balance was intact [PERRL With Normal Accommodation] : pupils were equal in size, round, reactive to light, with normal accommodation [Sclera] : the sclera and conjunctiva were normal [Extraocular Movements] : extraocular movements were intact [Outer Ear] : the ears and nose were normal in appearance [Neck Appearance] : the appearance of the neck was normal [Oropharynx] : the oropharynx was normal [Auscultation Breath Sounds / Voice Sounds] : lungs were clear to auscultation bilaterally [Heart Rate And Rhythm] : heart rate was normal and rhythm regular [Heart Sounds] : normal S1 and S2 [Murmurs] : no murmurs [Edema] : there was no peripheral edema [Full Pulse] : the pedal pulses are present [Arterial Pulses Carotid] : carotid pulses were normal with no bruits [Bowel Sounds] : normal bowel sounds [Abdomen Tenderness] : non-tender [Abdomen Soft] : soft [No CVA Tenderness] : no ~M costovertebral angle tenderness [Abnormal Walk] : normal gait [Nail Clubbing] : no clubbing  or cyanosis of the fingernails [Skin Turgor] : normal skin turgor [] : no rash [Skin Color & Pigmentation] : normal skin color and pigmentation [Facial Sensation Decrease - V1 Right Only] : normal over the forehead and anterior scalp [Facial Sensation Decrease - V2 Right Only] : normal over the side of the nose, infraorbital area, and upper lip [Facial Sensation Decrease - V3 Right Only] : normal over the chin and lower lip [Facial Sensation Decrease - V1 Left Only] : decreased over the forehead and anterior scalp [Facial Sensation Decrease - V2 Left Only] : decreased over the side of the nose, infraorbital area, and upper lip [Facial Sensation Decrease - V3 Left Only] : normal over the chin and lower lip [CNS Accessory - Diminished Shoulder Elev. - Left Trapezius] : no weakness of shoulder elevation on the left [CNS Accessory - Sternocleidomastoid Weakness Left Only] : no sternocleidomastoid weakness on the left [Motor Strength Upper Extremities Bilaterally] : strength was normal in both upper extremities [Paresis Pronator Drift Right-Sided] : no pronator drift on the right [Paresis Pronator Drift Left-Sided] : no pronator drift on the left [Motor Strength Lower Extremities Bilaterally] : strength was normal in both lower extremities [Allodynia] : no ~T allodynia present [Past-pointing] : there was no past-pointing [Romberg's Sign] : Romberg's sign was negtive [Tremor] : no tremor present [Dysdiadochokinesia Bilaterally] : not present [Coordination - Dysmetria Impaired Finger-to-Nose Bilateral] : not present [Coordination - Dysmetria Impaired Heel-to-Shin Bilateral] : not present [1+] : Ankle jerk left 1+ [Plantar Reflex Right Only] : normal on the right [Plantar Reflex Left Only] : normal on the left [___] : absent on the right [___] : absent on the left [FreeTextEntry7] : Sensory deficit to light touch and pinprick present at central upper back [FreeTextEntry8] : Normal, narrow-based gait. No difficulty with tiptoe, heel, and tandem gaits. [FreeTextEntry1] : No retropulsion, bradykinesia, or en bloc turning

## 2020-06-11 NOTE — REVIEW OF SYSTEMS
[As Noted in HPI] : as noted in HPI [Anxiety] : anxiety [Negative] : Endocrine [de-identified] : Patient reports easily getting angry and stressed due to persistent pain

## 2020-06-11 NOTE — DATA REVIEWED
[FreeTextEntry1] : MRI Cervical Spine (6/20/19):\par - Multilevel cervical spondylosis\par - Multilevel b/l neuroforaminal narrowing\par - Central canal prominence vs. syrinx\par \par MRI Prostate w/wo Gadolinium (4/11/18): Per report,\par - No evidence of disease in the pelvis

## 2020-06-17 ENCOUNTER — APPOINTMENT (OUTPATIENT)
Dept: OPHTHALMOLOGY | Facility: CLINIC | Age: 70
End: 2020-06-17

## 2020-06-23 ENCOUNTER — APPOINTMENT (OUTPATIENT)
Dept: NEUROSURGERY | Facility: CLINIC | Age: 70
End: 2020-06-23
Payer: MEDICARE

## 2020-06-23 VITALS
SYSTOLIC BLOOD PRESSURE: 128 MMHG | HEIGHT: 70 IN | DIASTOLIC BLOOD PRESSURE: 84 MMHG | BODY MASS INDEX: 25.77 KG/M2 | WEIGHT: 180 LBS | HEART RATE: 84 BPM

## 2020-06-23 VITALS — TEMPERATURE: 207.5 F

## 2020-06-23 PROCEDURE — 99203 OFFICE O/P NEW LOW 30 MIN: CPT

## 2020-06-23 RX ORDER — AMITRIPTYLINE HYDROCHLORIDE 50 MG/1
50 TABLET, FILM COATED ORAL
Qty: 90 | Refills: 1 | Status: DISCONTINUED | COMMUNITY
Start: 2019-05-09 | End: 2020-06-23

## 2020-06-23 NOTE — ASSESSMENT
[FreeTextEntry1] : 69 year old male with neck pain and possible cervical radicular pain.  Given the atypical presentation of his pain, I do recommend he undergo EMG/NCS to help delineate the exact pathology of his pain.  He will return to see me once he has completed the study so that we may review the results and discuss his further treatment options.

## 2020-06-23 NOTE — DATA REVIEWED
[de-identified] : MRI cervical spine Multilevel cervical spondylosis with exaggeration of the cervical lordosis, mild retrolisthesis of C3 on C4, C4 on \par C5, and C5 on C6, and trace degenerative grade 1 anterolisthesis of C7 on T1 on T2. \par Mild prominence of the central canal versus a small cervical cord syrinx extending from the level of C4 through \par the upper cervical spine. \par C3/4: There is moderate to severe bilateral neural foraminal narrowing, left greater than right. C4/5: There is mild to moderate spinal canal narrowing and bilateral neural foraminal narrowing, severe on the \par right and mild to moderate on the left. The left vertebral artery occupies the near entirety of the left neural \par foramen. \par C5/6: There is moderate right neural foraminal narrowing. \par C6/7: There is mild spinal canal narrowing and bilateral neural foraminal narrowing, moderate to severe on the \par left and moderate on the right.

## 2020-06-23 NOTE — HISTORY OF PRESENT ILLNESS
[5] : a current pain level of 5/10 [___ yrs] : [unfilled] year(s) ago [Neck] : neck [Shooting] : shooting [10] : a maximum pain level of 10/10 [Laying] : laying [Insomnia] : insomnia [Weakness] : weakness [PT] : PT [Medications] : medications [Injections] : injections [FreeTextEntry2] : left hand [FreeTextEntry6] : Tramadol, had one cervical epidural injection about 3-4 years ago which didn't help [FreeTextEntry4] : cannot identify

## 2020-06-23 NOTE — PHYSICAL EXAM
[General Appearance - Alert] : alert [Mood] : the mood was normal [Motor Strength] : muscle strength was normal in all four extremities [Sensation Tactile Decrease] : light touch was intact [2+] : Brachioradialis right 2+ [No Spinal Tenderness] : no spinal tenderness [] : no rash

## 2020-07-23 ENCOUNTER — APPOINTMENT (OUTPATIENT)
Dept: RADIATION ONCOLOGY | Facility: CLINIC | Age: 70
End: 2020-07-23
Payer: MEDICARE

## 2020-07-28 ENCOUNTER — APPOINTMENT (OUTPATIENT)
Dept: NEUROLOGY | Facility: CLINIC | Age: 70
End: 2020-07-28
Payer: MEDICARE

## 2020-07-28 VITALS — TEMPERATURE: 208.4 F

## 2020-07-28 PROCEDURE — 95886 MUSC TEST DONE W/N TEST COMP: CPT

## 2020-07-28 PROCEDURE — 95910 NRV CNDJ TEST 7-8 STUDIES: CPT

## 2020-08-07 ENCOUNTER — APPOINTMENT (OUTPATIENT)
Dept: NEUROSURGERY | Facility: CLINIC | Age: 70
End: 2020-08-07

## 2020-08-13 ENCOUNTER — APPOINTMENT (OUTPATIENT)
Dept: RADIATION ONCOLOGY | Facility: CLINIC | Age: 70
End: 2020-08-13
Payer: MEDICARE

## 2020-08-13 VITALS
HEART RATE: 82 BPM | WEIGHT: 186.95 LBS | RESPIRATION RATE: 16 BRPM | OXYGEN SATURATION: 98 % | BODY MASS INDEX: 26.76 KG/M2 | SYSTOLIC BLOOD PRESSURE: 161 MMHG | TEMPERATURE: 97.4 F | HEIGHT: 70 IN | DIASTOLIC BLOOD PRESSURE: 98 MMHG

## 2020-08-13 PROCEDURE — 99213 OFFICE O/P EST LOW 20 MIN: CPT

## 2020-08-13 NOTE — DISEASE MANAGEMENT
[RadicalProstatectomyDate] : 1/29/2009 [RadiationCompletedDate] : 4/2/19 [EBRTDose] : 7036 [EBRTFractions] : 39

## 2020-08-13 NOTE — HISTORY OF PRESENT ILLNESS
[FreeTextEntry1] : Mr. Mccarty is a 68 years old male with adenocarcinoma of the prostate. He is s/p radical prostatectomy on 1/29/2009 with Dr. Jacobson at [facility] [place]. His PSA started rising few years after surgery. He was treated with salvage radiation therapy to the prostate bed to a dose of 7020 cGy between 2/4/19 - 4/2/19. Patient started and completed radiation treatment well. He did not develop any grade 3 or higher acute toxicity or taking any unscheduled treatment break. He received 6 months of hormones. . \par \par He presents here today for follow up. \par \par PSA \par <0.01  -  5/2/19\par <0.01  -  9/26/19.\par <0.01  -  7/23/20\par \par

## 2020-08-26 ENCOUNTER — OUTPATIENT (OUTPATIENT)
Dept: OUTPATIENT SERVICES | Facility: HOSPITAL | Age: 70
LOS: 1 days | End: 2020-08-26
Payer: MEDICARE

## 2020-08-26 ENCOUNTER — APPOINTMENT (OUTPATIENT)
Dept: MRI IMAGING | Facility: CLINIC | Age: 70
End: 2020-08-26
Payer: MEDICARE

## 2020-08-26 DIAGNOSIS — R20.0 ANESTHESIA OF SKIN: ICD-10-CM

## 2020-08-26 DIAGNOSIS — H52.13 MYOPIA, BILATERAL: ICD-10-CM

## 2020-08-26 PROCEDURE — 70553 MRI BRAIN STEM W/O & W/DYE: CPT | Mod: 26

## 2020-08-26 PROCEDURE — A9585: CPT

## 2020-08-26 PROCEDURE — 70553 MRI BRAIN STEM W/O & W/DYE: CPT

## 2020-10-27 ENCOUNTER — APPOINTMENT (OUTPATIENT)
Dept: NEUROLOGY | Facility: CLINIC | Age: 70
End: 2020-10-27
Payer: MEDICARE

## 2020-10-27 VITALS
BODY MASS INDEX: 27.06 KG/M2 | DIASTOLIC BLOOD PRESSURE: 89 MMHG | HEIGHT: 70 IN | WEIGHT: 189 LBS | SYSTOLIC BLOOD PRESSURE: 133 MMHG | HEART RATE: 70 BPM

## 2020-10-27 VITALS — TEMPERATURE: 97.2 F

## 2020-10-27 PROCEDURE — 99072 ADDL SUPL MATRL&STAF TM PHE: CPT

## 2020-10-27 PROCEDURE — 99215 OFFICE O/P EST HI 40 MIN: CPT

## 2020-10-27 RX ORDER — CYCLOBENZAPRINE HYDROCHLORIDE 5 MG/1
5 TABLET, FILM COATED ORAL
Qty: 30 | Refills: 2 | Status: ACTIVE | COMMUNITY
Start: 2019-03-18 | End: 1900-01-01

## 2020-10-27 RX ORDER — TRAMADOL HYDROCHLORIDE 50 MG/1
50 TABLET, COATED ORAL TWICE DAILY
Qty: 60 | Refills: 2 | Status: ACTIVE | COMMUNITY
Start: 2019-03-18 | End: 1900-01-01

## 2020-10-27 RX ORDER — AMITRIPTYLINE HYDROCHLORIDE 75 MG/1
75 TABLET, FILM COATED ORAL
Qty: 90 | Refills: 0 | Status: ACTIVE | COMMUNITY
Start: 2020-06-11 | End: 1900-01-01

## 2020-10-27 RX ORDER — TAMSULOSIN HYDROCHLORIDE 0.4 MG/1
0.4 CAPSULE ORAL
Qty: 30 | Refills: 3 | Status: DISCONTINUED | COMMUNITY
Start: 2020-08-13 | End: 2020-10-27

## 2020-11-02 NOTE — PHYSICAL EXAM
[General Appearance - Alert] : alert [General Appearance - Well Nourished] : well nourished [Oriented To Time, Place, And Person] : oriented to person, place, and time [Person] : oriented to person [Place] : oriented to place [Time] : oriented to time [Visual Intact] : visual attention was ~T not ~L decreased [Naming Objects] : no difficulty naming common objects [Repeating Phrases] : no difficulty repeating a phrase [Fluency] : fluency intact [Comprehension] : comprehension intact [Cranial Nerves Optic (II)] : visual acuity intact bilaterally,  visual fields full to confrontation, pupils equal round and reactive to light [Cranial Nerves Oculomotor (III)] : extraocular motion intact [Cranial Nerves Facial (VII)] : face symmetrical [Cranial Nerves Vestibulocochlear (VIII)] : hearing was intact bilaterally [Cranial Nerves Glossopharyngeal (IX)] : tongue and palate midline [Cranial Nerves Hypoglossal (XII)] : there was no tongue deviation with protrusion [CNS Accessory - Diminished Shoulder Elev. - Right Trapezius] : weakness of shoulder elevation was present on the right [CNS Accessory - Sternocleidomastoid Weakness Right Only] : sternocleidomastoid weakness was present on the right [Motor Tone] : muscle tone was normal in all four extremities [Motor Strength] : muscle strength was normal in all four extremities [Involuntary Movements] : no involuntary movements were seen [No Muscle Atrophy] : normal bulk in all four extremities [Motor Handedness Right-Handed] : the patient is right hand dominant [Sensation Tactile Decrease] : light touch was intact [Sensation Pain / Temperature Decrease] : pain and temperature was intact [Balance] : balance was intact [1+] : Ankle jerk left 1+ [Sclera] : the sclera and conjunctiva were normal [PERRL With Normal Accommodation] : pupils were equal in size, round, reactive to light, with normal accommodation [Extraocular Movements] : extraocular movements were intact [Outer Ear] : the ears and nose were normal in appearance [Oropharynx] : the oropharynx was normal [Neck Appearance] : the appearance of the neck was normal [Auscultation Breath Sounds / Voice Sounds] : lungs were clear to auscultation bilaterally [Heart Rate And Rhythm] : heart rate was normal and rhythm regular [Heart Sounds] : normal S1 and S2 [Murmurs] : no murmurs [Arterial Pulses Carotid] : carotid pulses were normal with no bruits [Full Pulse] : the pedal pulses are present [Edema] : there was no peripheral edema [Bowel Sounds] : normal bowel sounds [Abdomen Soft] : soft [Abdomen Tenderness] : non-tender [No CVA Tenderness] : no ~M costovertebral angle tenderness [Abnormal Walk] : normal gait [Nail Clubbing] : no clubbing  or cyanosis of the fingernails [Skin Color & Pigmentation] : normal skin color and pigmentation [Skin Turgor] : normal skin turgor [] : no rash [Facial Sensation Decrease - V1 Right Only] : normal over the forehead and anterior scalp [Facial Sensation Decrease - V2 Right Only] : normal over the side of the nose, infraorbital area, and upper lip [Facial Sensation Decrease - V3 Right Only] : normal over the chin and lower lip [Facial Sensation Decrease - V1 Left Only] : normal over the forehead and anterior scalp [Facial Sensation Decrease - V2 Left Only] : normal over the side of the nose, infraorbital area, and upper lip [Facial Sensation Decrease - V3 Left Only] : normal over the chin and lower lip [CNS Accessory - Diminished Shoulder Elev. - Left Trapezius] : no weakness of shoulder elevation on the left [CNS Accessory - Sternocleidomastoid Weakness Left Only] : no sternocleidomastoid weakness on the left [Paresis Pronator Drift Right-Sided] : no pronator drift on the right [Paresis Pronator Drift Left-Sided] : no pronator drift on the left [Motor Strength Upper Extremities Bilaterally] : strength was normal in both upper extremities [Motor Strength Lower Extremities Bilaterally] : strength was normal in both lower extremities [Romberg's Sign] : Romberg's sign was negtive [Allodynia] : no ~T allodynia present [Past-pointing] : there was no past-pointing [Tremor] : no tremor present [Dysdiadochokinesia Bilaterally] : not present [Coordination - Dysmetria Impaired Finger-to-Nose Bilateral] : not present [Coordination - Dysmetria Impaired Heel-to-Shin Bilateral] : not present [Plantar Reflex Right Only] : normal on the right [Plantar Reflex Left Only] : normal on the left [___] : absent on the right [___] : absent on the left [FreeTextEntry7] : Sensory deficit to light touch and pinprick present at central upper back [FreeTextEntry8] : Normal, narrow-based gait. No difficulty with tiptoe, heel, and tandem gaits. [FreeTextEntry1] : No retropulsion, bradykinesia, or en bloc turning

## 2020-11-02 NOTE — REVIEW OF SYSTEMS
[As Noted in HPI] : as noted in HPI [Anxiety] : anxiety [Negative] : Heme/Lymph [de-identified] : Patient reports easily irritability due to persistent pain

## 2020-11-02 NOTE — ASSESSMENT
[FreeTextEntry1] : 70 LHM with chronic cervicalgia, likely musculoskeletal in origin given normal EMG/NCV findings, also with cervicogenic headaches.

## 2020-11-02 NOTE — DATA REVIEWED
[FreeTextEntry1] : MRI Brain w/wo Gadolinium (8/26/20): Normal study, as above\par \par EMG/NCV (7/28/20):\par - B/l ulnar nerve conduction delays at elbows\par - B/l low SNAP amplitudes in arms\par - No evidence of cervical radiculopathy\par \par MRI Cervical Spine (6/20/19):\par - Multilevel cervical spondylosis\par - Multilevel b/l neuroforaminal narrowing\par - Central canal prominence vs. syrinx\par \par MRI Prostate w/wo Gadolinium (4/11/18): Per report,\par - No evidence of disease in the pelvis

## 2020-11-02 NOTE — HISTORY OF PRESENT ILLNESS
[FreeTextEntry1] : FROM 5/21/19:\par This is a 68-year-old left-handed man with progressively worsening neck pain over the past several years, [facility] and last seen by me in this clinic on March 18, 2019. The patient is seen today for urgent follow-up because of worsening of neck pain, as well as a 2-month history of developing headaches starting at the back of the head and radiating up and over to the forehead, associated with photophobia and phonophobia, making it difficult for him to drive during the day and view his computer screen. The pain level improves with the use of Excedrin, but only for a couple of hours at a time. The neck pain can still flare up into a vise-like pain, rated 8/10, 2-3 times per week. The physical therapy course that I prescribed has not seemed to help much. I prescribed him a Medrol Dose Pack 2 weeks ago, which he completed yesterday, but this has also not appeared to help much.\par \par FROM 6/11/20:\par The patient, now 69, states that he continues to have persistent neck pain, only minimally relieved with amitriptyline 50mg PO QHS and tramadol 50mg PO BID PRN pain. He and his wife have also noticed over the past several months that the patient has tremors of the head and of the left arm, especially when trying to do activities, and this can interfere with the patient's ability to hold onto items with his left hand. He has noticed over the past 2 months that he has become more sensitive to light and smells.\par \par FROM 10/27/20:\par Since the last clinic visit, the patient, now 70, has seen neurosurgeon, Dr. Evelyn Varma, for evaluation for potential cervical spinal injections. Dr. Varma arranged for the patient to have an EMG/NCV, which revealed b/l ulnar nerve conduction delays at the elbows and sensory abnormalities, but no evidence of cervical radiculopathy. The patient states that he continues to have significant, sharp pain in his neck, which radiates to his arms. For a few days last weeks, it increased intensity to 9/10 for a few days, with radiating dull sensation in both arms. He has noticed difficulty focusing his vision, especially when he has worsening of his neck pain or headache, but no loss of vision. He is experiencing sharp headache several times per week, linked with the neck pain. The head and neck pain interferes with his ability to sleep. He finds mild relief in his neck pain when he leans forward while keeping his neck straight.

## 2020-11-10 ENCOUNTER — APPOINTMENT (OUTPATIENT)
Dept: PHYSICAL MEDICINE AND REHAB | Facility: CLINIC | Age: 70
End: 2020-11-10
Payer: MEDICARE

## 2020-11-10 VITALS — WEIGHT: 190 LBS | HEIGHT: 72 IN | BODY MASS INDEX: 25.73 KG/M2

## 2020-11-10 LAB — PSA SERPL-MCNC: <0.01 NG/ML

## 2020-11-10 PROCEDURE — 99204 OFFICE O/P NEW MOD 45 MIN: CPT

## 2020-11-10 PROCEDURE — 99072 ADDL SUPL MATRL&STAF TM PHE: CPT

## 2020-11-10 RX ORDER — TIZANIDINE 2 MG/1
2 TABLET ORAL
Qty: 60 | Refills: 0 | Status: ACTIVE | COMMUNITY
Start: 2020-11-10 | End: 1900-01-01

## 2020-11-10 NOTE — PHYSICAL EXAM
[FreeTextEntry1] : Gen: NAD\par Neck: supple, +spasm lower cervical paraspinals and upper trapezius bilaterally, ROM limited by pain, pos spurling left\par Shoulders: neg neer's, neg hawkin's, neg speed's, FAROM with no pain, non-tender to palpation\par CV: no cyanosis\par Pulm: breathing well on room air\par Abd: soft\par Msk: \par 5/5 hip flexion B/L, 5/5 knee extension B/L, 5/5 knee flexion B/L, 5/5 dorsiflexion B/L, 5/5 plantar flexion B/L\par 5/5 shoulder abduction B/L, 5/5 elbow flexion B/L, 5/5 elbow extension B/L, 5/5 wrist extension B/L, 5/5 hand  B/L\par Neuro: sensation intact to light touch in bilateral upper and lower extremities, reflexes 2+ brachioradialis, biceps, triceps bilaterally, reflexes 2+ patella, medial hamstring, achilles bilaterally, negative babinski, negative barber\par

## 2020-11-10 NOTE — HISTORY OF PRESENT ILLNESS
[FreeTextEntry1] : 71 yo M with PMH prostate cancer who presents with neck pain.\par \par Onset:  3 years ago but progressive over the last year.  No inciting events, trauma, or falls.\par Location: lower left cervical spine\par Characteristics: sharp\par Aggravating factors: head movements\par Alleviating factors: rest\par Radiation: left upper extremity\par Treatments: tylenol, excedrin, tramadol, medrol dose pack with some relief in his pain, physical therapy/HEP with minimal improvement.\par Severity: 7-9/10\par \par Diagnostic studies:\par MRI cervical spine w/o contrast 6/2019 showing multilevel degenerative changes with moderate-severe left foraminal stenosis.\par EMG/NCS without evidence of cervical radiculopathy\par \par Patient denies new weakness, numbness or paresthesia.  Patient denies bowel/bladder dysfunction, fevers, chills, weight loss, night pain, or night sweats.\par

## 2020-11-10 NOTE — ASSESSMENT
[FreeTextEntry1] : 69 yo M who presents with neck pain with radiation into left upper extremity consistent with cervical radiculopathy and cervical spinal stenosis.  Patient recommended to follow up with Dr. Varma for additional treatment and management to ensure optimal continuity of care.  \par \par -MRI cervical spine w/o contrast ordered\par -Start medrol dose pack, dispense 1 pack.  Patient warned to avoid use of PO NSAIDS while on oral steroids.  \par -Start tizanidine 2 mg PO qHS prn pain.\par -RTC following MRI to review results.\par \par Andreas Candelario MD\par Spine and Sports Medicine\par \par [employer]\par At [facility]\par \par \par

## 2020-11-30 ENCOUNTER — OUTPATIENT (OUTPATIENT)
Dept: OUTPATIENT SERVICES | Facility: HOSPITAL | Age: 70
LOS: 1 days | End: 2020-11-30
Payer: MEDICARE

## 2020-11-30 ENCOUNTER — APPOINTMENT (OUTPATIENT)
Dept: MRI IMAGING | Facility: CLINIC | Age: 70
End: 2020-11-30
Payer: MEDICARE

## 2020-11-30 DIAGNOSIS — M54.12 RADICULOPATHY, CERVICAL REGION: ICD-10-CM

## 2020-11-30 PROCEDURE — 72141 MRI NECK SPINE W/O DYE: CPT

## 2020-11-30 PROCEDURE — 72141 MRI NECK SPINE W/O DYE: CPT | Mod: 26

## 2020-12-09 ENCOUNTER — APPOINTMENT (OUTPATIENT)
Dept: PHYSICAL MEDICINE AND REHAB | Facility: CLINIC | Age: 70
End: 2020-12-09
Payer: MEDICARE

## 2020-12-09 VITALS
TEMPERATURE: 97.7 F | HEART RATE: 75 BPM | SYSTOLIC BLOOD PRESSURE: 151 MMHG | OXYGEN SATURATION: 97 % | DIASTOLIC BLOOD PRESSURE: 90 MMHG

## 2020-12-09 DIAGNOSIS — G89.29 RADICULOPATHY, LUMBAR REGION: ICD-10-CM

## 2020-12-09 DIAGNOSIS — M54.12 RADICULOPATHY, CERVICAL REGION: ICD-10-CM

## 2020-12-09 DIAGNOSIS — M54.16 RADICULOPATHY, LUMBAR REGION: ICD-10-CM

## 2020-12-09 DIAGNOSIS — G89.29 CERVICALGIA: ICD-10-CM

## 2020-12-09 DIAGNOSIS — M54.2 CERVICALGIA: ICD-10-CM

## 2020-12-09 DIAGNOSIS — M48.07 SPINAL STENOSIS, LUMBOSACRAL REGION: ICD-10-CM

## 2020-12-09 PROCEDURE — 99072 ADDL SUPL MATRL&STAF TM PHE: CPT

## 2020-12-09 PROCEDURE — 20553 NJX 1/MLT TRIGGER POINTS 3/>: CPT

## 2020-12-09 PROCEDURE — 76942 ECHO GUIDE FOR BIOPSY: CPT

## 2020-12-09 PROCEDURE — 99214 OFFICE O/P EST MOD 30 MIN: CPT | Mod: 25

## 2020-12-09 NOTE — HISTORY OF PRESENT ILLNESS
[FreeTextEntry1] : 12/9/20\par 71 yo M who presents for follow up with neck pain.  Patient last seen on 11/10/20 and started on medrol dose pack, tizanidine and referred back to Dr. Varma for further management and treatment.  However, patient reports that medrol dose pack and tizanidine led to significant GI issues and he self dc'd after a few doses.  Patient also reports that he has been non-compliant with recommendations to follow up with Dr. Varma.  Patient reports that neck pain is about the same as previously.  Pain is non-radiating and worse with prolonged standing.  Patient denies new weakness, numbness or paresthesia.  Denies bowel/bladder dysfunction, fevers, chills, weight loss, night pain, or night sweats.\par \par 11/10/20\par 71 yo M with PMH prostate cancer who presents with neck pain.\par \par Onset:  3 years ago but progressive over the last year.  No inciting events, trauma, or falls.\par Location: lower left cervical spine\par Characteristics: sharp\par Aggravating factors: head movements\par Alleviating factors: rest\par Radiation: left upper extremity\par Treatments: tylenol, excedrin, tramadol, medrol dose pack with some relief in his pain, physical therapy/HEP with minimal improvement.\par Severity: 7-9/10\par \par Diagnostic studies:\par MRI cervical spine w/o contrast 6/2019 showing multilevel degenerative changes with moderate-severe left foraminal stenosis.\par EMG/NCS without evidence of cervical radiculopathy\par \par Patient denies new weakness, numbness or paresthesia.  Patient denies bowel/bladder dysfunction, fevers, chills, weight loss, night pain, or night sweats.\par

## 2020-12-09 NOTE — PHYSICAL EXAM
[FreeTextEntry1] : Gen: NAD\par Neck: supple, +spasm lower cervical paraspinals and upper trapezius bilaterally, ROM limited by pain, neg spurling\par Shoulders: neg neer's, neg hawkin's, neg speed's, FAROM with no pain, non-tender to palpation\par CV: no cyanosis\par Pulm: breathing well on room air\par Abd: soft\par Msk: \par 5/5 hip flexion B/L, 5/5 knee extension B/L, 5/5 knee flexion B/L, 5/5 dorsiflexion B/L, 5/5 plantar flexion B/L\par 5/5 shoulder abduction B/L, 5/5 elbow flexion B/L, 5/5 elbow extension B/L, 5/5 wrist extension B/L, 5/5 hand  B/L\par Neuro: sensation intact to light touch in bilateral upper and lower extremities, reflexes 2+ brachioradialis, biceps, triceps bilaterally, reflexes 2+ patella, medial hamstring, achilles bilaterally, negative babinski, negative barber\par

## 2020-12-09 NOTE — ASSESSMENT
[FreeTextEntry1] : 69 yo M who presents with neck pain with radiation into left upper extremity consistent with cervical radiculopathy and cervical spinal stenosis.  Patient recommended to follow up with Dr. Varma for additional treatment and management to ensure optimal continuity of care.  \par \par -MRI cervical spine w/o contrast reviewed\par -DC medrol dose pack and tizanidine given adverse reactions\par -US guided cervical and upper trapezius trigger point injections performed this AM with significant improvement in his pain.\par -Start volteran gel 1% QID neck\par -RTC 3 weeks\par \par Andreas Candelario MD\par Spine and Sports Medicine\par \par [employer]\par At [facility]\par \par \par

## 2020-12-09 NOTE — PROCEDURE
[de-identified] : Procedure: lower cervical paraspinal and bilateral upper trapezius muscle trigger point injection.\par Side: Bilateral\par Patient positioning: prone\par Target identification: the body region was palpated as needed in order to localize the area of maximal tenderness.  The overlying skin was marked as necessary.\par Skin sterilization: The overlying skin was widely prepped with a chlorhexidine scrub, which was then allowed to dry for 30 seconds.\par Procedure: A 25 gauge 3.5 inch needle was then inserted through the sterilized skin and directed to the paraspinal muscles at C5-C7 area and bilateral upper trapezius in which patient has pain and muscle twitching as small amounts of lidocaine mixed with dexamethasone were deposited.  Aspiration was negative for blood prior to injection.\par Injectate: 5 cc 0.5% lidocaine + 0.5 mL dexamethasone (10 mg/ml)\par Post Procedure: Band-Aids were then applied and the patient was advised to leave this on until the next day.  The patient tolerated the procedure well and reported significant pain relief following the procedure.\par

## 2020-12-17 ENCOUNTER — APPOINTMENT (OUTPATIENT)
Dept: CV DIAGNOSTICS | Facility: HOSPITAL | Age: 70
End: 2020-12-17

## 2020-12-28 ENCOUNTER — APPOINTMENT (OUTPATIENT)
Dept: NEUROLOGY | Facility: CLINIC | Age: 70
End: 2020-12-28

## 2021-01-10 ENCOUNTER — APPOINTMENT (OUTPATIENT)
Dept: MRI IMAGING | Facility: IMAGING CENTER | Age: 71
End: 2021-01-10

## 2021-01-12 ENCOUNTER — APPOINTMENT (OUTPATIENT)
Dept: MRI IMAGING | Facility: CLINIC | Age: 71
End: 2021-01-12
Payer: MEDICARE

## 2021-01-12 ENCOUNTER — OUTPATIENT (OUTPATIENT)
Dept: OUTPATIENT SERVICES | Facility: HOSPITAL | Age: 71
LOS: 1 days | End: 2021-01-12
Payer: MEDICARE

## 2021-01-12 DIAGNOSIS — Z00.8 ENCOUNTER FOR OTHER GENERAL EXAMINATION: ICD-10-CM

## 2021-01-12 PROCEDURE — 72148 MRI LUMBAR SPINE W/O DYE: CPT

## 2021-01-12 PROCEDURE — 72148 MRI LUMBAR SPINE W/O DYE: CPT | Mod: 26

## 2021-01-13 ENCOUNTER — APPOINTMENT (OUTPATIENT)
Dept: CV DIAGNOSTICS | Facility: HOSPITAL | Age: 71
End: 2021-01-13

## 2021-01-14 ENCOUNTER — OUTPATIENT (OUTPATIENT)
Dept: OUTPATIENT SERVICES | Facility: HOSPITAL | Age: 71
LOS: 1 days | End: 2021-01-14
Payer: MEDICARE

## 2021-01-14 ENCOUNTER — APPOINTMENT (OUTPATIENT)
Dept: CV DIAGNOSTICS | Facility: HOSPITAL | Age: 71
End: 2021-01-14

## 2021-01-14 DIAGNOSIS — I25.10 ATHEROSCLEROTIC HEART DISEASE OF NATIVE CORONARY ARTERY WITHOUT ANGINA PECTORIS: ICD-10-CM

## 2021-01-14 PROCEDURE — 78452 HT MUSCLE IMAGE SPECT MULT: CPT | Mod: 26

## 2021-01-14 PROCEDURE — 78452 HT MUSCLE IMAGE SPECT MULT: CPT

## 2021-01-14 PROCEDURE — 93018 CV STRESS TEST I&R ONLY: CPT

## 2021-01-14 PROCEDURE — 93017 CV STRESS TEST TRACING ONLY: CPT

## 2021-01-14 PROCEDURE — 93016 CV STRESS TEST SUPVJ ONLY: CPT

## 2021-01-14 PROCEDURE — A9500: CPT

## 2021-02-18 ENCOUNTER — APPOINTMENT (OUTPATIENT)
Dept: RADIATION ONCOLOGY | Facility: CLINIC | Age: 71
End: 2021-02-18

## 2021-03-01 ENCOUNTER — NON-APPOINTMENT (OUTPATIENT)
Age: 71
End: 2021-03-01

## 2021-03-01 ENCOUNTER — APPOINTMENT (OUTPATIENT)
Dept: OPHTHALMOLOGY | Facility: CLINIC | Age: 71
End: 2021-03-01
Payer: MEDICARE

## 2021-03-01 PROCEDURE — 92014 COMPRE OPH EXAM EST PT 1/>: CPT

## 2021-03-01 PROCEDURE — 92250 FUNDUS PHOTOGRAPHY W/I&R: CPT

## 2021-03-01 PROCEDURE — 92083 EXTENDED VISUAL FIELD XM: CPT

## 2021-03-01 PROCEDURE — 99072 ADDL SUPL MATRL&STAF TM PHE: CPT

## 2021-03-11 ENCOUNTER — APPOINTMENT (OUTPATIENT)
Dept: RADIATION ONCOLOGY | Facility: CLINIC | Age: 71
End: 2021-03-11

## 2021-03-11 DIAGNOSIS — C61 MALIGNANT NEOPLASM OF PROSTATE: ICD-10-CM

## 2021-04-20 ENCOUNTER — APPOINTMENT (OUTPATIENT)
Dept: OPHTHALMOLOGY | Facility: CLINIC | Age: 71
End: 2021-04-20

## 2021-04-21 ENCOUNTER — APPOINTMENT (OUTPATIENT)
Dept: SURGERY | Facility: CLINIC | Age: 71
End: 2021-04-21
Payer: MEDICARE

## 2021-04-27 ENCOUNTER — APPOINTMENT (OUTPATIENT)
Dept: RADIOLOGY | Facility: CLINIC | Age: 71
End: 2021-04-27
Payer: MEDICARE

## 2021-04-27 PROCEDURE — 73562 X-RAY EXAM OF KNEE 3: CPT | Mod: RT

## 2021-04-29 ENCOUNTER — APPOINTMENT (OUTPATIENT)
Dept: SURGERY | Facility: CLINIC | Age: 71
End: 2021-04-29
Payer: MEDICARE

## 2021-04-29 VITALS
HEIGHT: 72 IN | RESPIRATION RATE: 16 BRPM | OXYGEN SATURATION: 95 % | DIASTOLIC BLOOD PRESSURE: 68 MMHG | TEMPERATURE: 97.2 F | WEIGHT: 176.37 LBS | HEART RATE: 82 BPM | SYSTOLIC BLOOD PRESSURE: 135 MMHG | BODY MASS INDEX: 23.89 KG/M2

## 2021-04-29 DIAGNOSIS — H00.12 CHALAZION RIGHT LOWER EYELID: ICD-10-CM

## 2021-04-29 DIAGNOSIS — U07.1 COVID-19: ICD-10-CM

## 2021-04-29 DIAGNOSIS — R14.0 ABDOMINAL DISTENSION (GASEOUS): ICD-10-CM

## 2021-04-29 DIAGNOSIS — R19.00 INTRA-ABDOMINAL AND PELVIC SWELLING, MASS AND LUMP, UNSPECIFIED SITE: ICD-10-CM

## 2021-04-29 DIAGNOSIS — K57.90 DIVERTICULOSIS OF INTESTINE, PART UNSPECIFIED, W/OUT PERFORATION OR ABSCESS W/OUT BLEEDING: ICD-10-CM

## 2021-04-29 DIAGNOSIS — K59.09 OTHER CONSTIPATION: ICD-10-CM

## 2021-04-29 DIAGNOSIS — K64.8 OTHER HEMORRHOIDS: ICD-10-CM

## 2021-04-29 PROCEDURE — 99072 ADDL SUPL MATRL&STAF TM PHE: CPT

## 2021-04-29 PROCEDURE — 99204 OFFICE O/P NEW MOD 45 MIN: CPT | Mod: 25

## 2021-04-29 PROCEDURE — 46221 LIGATION OF HEMORRHOID(S): CPT

## 2021-04-29 PROCEDURE — 99204 OFFICE O/P NEW MOD 45 MIN: CPT

## 2021-04-29 RX ORDER — ASCORBIC ACID 500 MG
500 TABLET ORAL
Refills: 0 | Status: ACTIVE | COMMUNITY

## 2021-04-29 RX ORDER — METHYLPREDNISOLONE 4 MG/1
4 TABLET ORAL
Qty: 1 | Refills: 0 | Status: DISCONTINUED | COMMUNITY
Start: 2020-11-10 | End: 2021-04-29

## 2021-04-29 RX ORDER — DICLOFENAC SODIUM 1% 10 MG/G
1 GEL TOPICAL
Qty: 1 | Refills: 0 | Status: DISCONTINUED | COMMUNITY
Start: 2020-12-09 | End: 2021-04-29

## 2021-04-29 RX ORDER — LINACLOTIDE 145 UG/1
145 CAPSULE, GELATIN COATED ORAL
Refills: 0 | Status: ACTIVE | COMMUNITY

## 2021-04-29 RX ORDER — TOPIRAMATE 25 MG/1
25 TABLET, FILM COATED ORAL
Qty: 120 | Refills: 1 | Status: DISCONTINUED | COMMUNITY
Start: 2020-10-27 | End: 2021-04-29

## 2021-04-29 NOTE — ASSESSMENT
[FreeTextEntry1] : I have seen and evaluated patient and I have corroborated all nursing input into this note.  Patient with symptomatic hemorrhoid prolapse.  I recommended rubber band ligation.  Indications, risk, benefits, alternatives reviewed including but not limited to bleeding, infection, failure, and recurrence.  Patient agreed.  Left lateral hemorrhoid banded.  Post band instruction sheet reviewed.  Patient will follow-up for further bands if his symptoms persist.

## 2021-04-29 NOTE — PHYSICAL EXAM
[Normal Breath Sounds] : Normal breath sounds [Normal Heart Sounds] : normal heart sounds [No Rash or Lesion] : No rash or lesion [Alert] : alert [Oriented to Person] : oriented to person [Oriented to Place] : oriented to place [Anxious] : anxious [JVD] : no jugular venous distention  [Oriented to Time] : disoriented to time [de-identified] : wnl [de-identified] : wnl - right eye droop - glasses [de-identified] : full ROM [FreeTextEntry1] : Perianal inspection and digital exam unremarkable.  Moderate internal hemorrhoids with squamous metaplasia, consistent with prolapse identified on anoscopy.  Left lateral largest.  Right posterior second largest.

## 2021-04-29 NOTE — HISTORY OF PRESENT ILLNESS
[FreeTextEntry1] : Wai is a 71 y/o male here for a consultation for painful/itching hemorrhoids for the past 2 weeks. He has tried hydrocortisone, prep H and nothing is helping. He has a hx of IBS-C, taking Linzess and psyllium fiber  but still does not have a bm daily.  Patient reports prolapsing tissue with bowel movements which reduces spontaneously.\par Colonoscopy from 12/17/2019 demonstrates one 3 mm polyp in the ascending colon and diverticulosis in the sigmoid, descending and ascending colon.

## 2021-05-20 ENCOUNTER — APPOINTMENT (OUTPATIENT)
Dept: SURGERY | Facility: CLINIC | Age: 71
End: 2021-05-20
Payer: MEDICARE

## 2021-05-20 VITALS
TEMPERATURE: 97.3 F | SYSTOLIC BLOOD PRESSURE: 120 MMHG | RESPIRATION RATE: 16 BRPM | HEART RATE: 78 BPM | DIASTOLIC BLOOD PRESSURE: 63 MMHG | OXYGEN SATURATION: 96 %

## 2021-05-20 DIAGNOSIS — K64.1 SECOND DEGREE HEMORRHOIDS: ICD-10-CM

## 2021-05-20 PROCEDURE — 46221 LIGATION OF HEMORRHOID(S): CPT

## 2021-05-20 PROCEDURE — 99072 ADDL SUPL MATRL&STAF TM PHE: CPT

## 2021-05-20 RX ORDER — FAMOTIDINE 20 MG/1
20 TABLET, FILM COATED ORAL
Qty: 90 | Refills: 0 | Status: ACTIVE | COMMUNITY
Start: 2021-04-07

## 2021-05-20 RX ORDER — PREDNISONE 20 MG/1
20 TABLET ORAL
Qty: 5 | Refills: 0 | Status: DISCONTINUED | COMMUNITY
Start: 2021-03-16

## 2021-05-20 RX ORDER — CHLORHEXIDINE GLUCONATE, 0.12% ORAL RINSE 1.2 MG/ML
0.12 SOLUTION DENTAL
Qty: 473 | Refills: 0 | Status: ACTIVE | COMMUNITY
Start: 2020-11-18

## 2021-05-20 RX ORDER — PANTOPRAZOLE 40 MG/1
40 TABLET, DELAYED RELEASE ORAL
Qty: 90 | Refills: 0 | Status: DISCONTINUED | COMMUNITY
Start: 2019-12-30

## 2021-05-20 RX ORDER — DOXYCYCLINE HYCLATE 100 MG/1
100 CAPSULE ORAL
Qty: 14 | Refills: 0 | Status: DISCONTINUED | COMMUNITY
Start: 2021-03-16

## 2021-05-20 NOTE — PHYSICAL EXAM
[FreeTextEntry1] : Perianal inspection and digital exam unremarkable.  Healing band site left lateral.  Large right posterior hemorrhoid on anoscopy.

## 2021-05-20 NOTE — ASSESSMENT
[FreeTextEntry1] : Patient continues to have burning.  Right posterior hemorrhoid banded.  Post band instruction sheet reviewed.  Perianal skin care discussed.

## 2021-05-20 NOTE — HISTORY OF PRESENT ILLNESS
[FreeTextEntry1] : Wai is a 69 y/o male here for a follow up visit. He was last seen on 04/29/21, Left lateral hemorrhoid banded. Right posterior was also large. Since then he still having burning inside the rectum independent of BM, no bleeding. Has BM Q 2-3 days with Linzess.  Banding performed because of anal burning.  Prolapsing hemorrhoids noted on anoscopy at prior visit.\par Colonoscopy with Dr. Muñoz from 12/17/2019 demonstrates one 3 mm polyp in the ascending colon and diverticulosis in the sigmoid, descending and ascending colon. Path: TA.\par

## 2021-07-01 ENCOUNTER — APPOINTMENT (OUTPATIENT)
Dept: OPHTHALMOLOGY | Facility: CLINIC | Age: 71
End: 2021-07-01

## 2021-09-20 ENCOUNTER — NON-APPOINTMENT (OUTPATIENT)
Age: 71
End: 2021-09-20

## 2021-09-20 ENCOUNTER — APPOINTMENT (OUTPATIENT)
Dept: OPHTHALMOLOGY | Facility: CLINIC | Age: 71
End: 2021-09-20
Payer: MEDICARE

## 2021-09-20 PROCEDURE — 92133 CPTRZD OPH DX IMG PST SGM ON: CPT

## 2021-09-20 PROCEDURE — 92083 EXTENDED VISUAL FIELD XM: CPT

## 2021-09-20 PROCEDURE — 92012 INTRM OPH EXAM EST PATIENT: CPT

## 2021-10-22 ENCOUNTER — APPOINTMENT (OUTPATIENT)
Dept: OPHTHALMOLOGY | Facility: CLINIC | Age: 71
End: 2021-10-22

## 2021-12-14 ENCOUNTER — APPOINTMENT (OUTPATIENT)
Dept: CT IMAGING | Facility: CLINIC | Age: 71
End: 2021-12-14

## 2021-12-14 ENCOUNTER — OUTPATIENT (OUTPATIENT)
Dept: OUTPATIENT SERVICES | Facility: HOSPITAL | Age: 71
LOS: 1 days | End: 2021-12-14
Payer: MEDICARE

## 2021-12-14 DIAGNOSIS — M48.062 SPINAL STENOSIS, LUMBAR REGION WITH NEUROGENIC CLAUDICATION: ICD-10-CM

## 2021-12-14 DIAGNOSIS — M47.812 SPONDYLOSIS WITHOUT MYELOPATHY OR RADICULOPATHY, CERVICAL REGION: ICD-10-CM

## 2021-12-14 PROCEDURE — 72125 CT NECK SPINE W/O DYE: CPT

## 2021-12-14 PROCEDURE — 72131 CT LUMBAR SPINE W/O DYE: CPT

## 2021-12-14 PROCEDURE — 72125 CT NECK SPINE W/O DYE: CPT | Mod: 26

## 2021-12-14 PROCEDURE — 72128 CT CHEST SPINE W/O DYE: CPT

## 2021-12-14 PROCEDURE — 72131 CT LUMBAR SPINE W/O DYE: CPT | Mod: 26

## 2021-12-14 PROCEDURE — 72128 CT CHEST SPINE W/O DYE: CPT | Mod: 26

## 2021-12-23 ENCOUNTER — APPOINTMENT (OUTPATIENT)
Dept: MRI IMAGING | Facility: CLINIC | Age: 71
End: 2021-12-23
Payer: MEDICARE

## 2021-12-23 ENCOUNTER — OUTPATIENT (OUTPATIENT)
Dept: OUTPATIENT SERVICES | Facility: HOSPITAL | Age: 71
LOS: 1 days | End: 2021-12-23
Payer: MEDICARE

## 2021-12-23 DIAGNOSIS — M48.062 SPINAL STENOSIS, LUMBAR REGION WITH NEUROGENIC CLAUDICATION: ICD-10-CM

## 2021-12-23 DIAGNOSIS — M47.812 SPONDYLOSIS WITHOUT MYELOPATHY OR RADICULOPATHY, CERVICAL REGION: ICD-10-CM

## 2021-12-23 PROCEDURE — 72148 MRI LUMBAR SPINE W/O DYE: CPT | Mod: 26

## 2021-12-23 PROCEDURE — 72141 MRI NECK SPINE W/O DYE: CPT | Mod: 26

## 2021-12-23 PROCEDURE — 72148 MRI LUMBAR SPINE W/O DYE: CPT

## 2021-12-23 PROCEDURE — 72141 MRI NECK SPINE W/O DYE: CPT

## 2021-12-30 ENCOUNTER — TRANSCRIPTION ENCOUNTER (OUTPATIENT)
Age: 71
End: 2021-12-30

## 2022-01-06 NOTE — PHYSICAL EXAM
[Normal] : oriented to person, place and time, the affect was normal, the mood was normal and not anxious
No cyanosis, no pallor, no jaundice, no rash

## 2022-03-07 ENCOUNTER — NON-APPOINTMENT (OUTPATIENT)
Age: 72
End: 2022-03-07

## 2022-03-07 ENCOUNTER — APPOINTMENT (OUTPATIENT)
Dept: OPHTHALMOLOGY | Facility: CLINIC | Age: 72
End: 2022-03-07
Payer: MEDICARE

## 2022-03-07 PROCEDURE — 92014 COMPRE OPH EXAM EST PT 1/>: CPT

## 2022-03-07 PROCEDURE — 92020 GONIOSCOPY: CPT

## 2022-03-07 PROCEDURE — 92250 FUNDUS PHOTOGRAPHY W/I&R: CPT

## 2022-03-07 PROCEDURE — 92083 EXTENDED VISUAL FIELD XM: CPT

## 2022-07-19 PROBLEM — H40.003 GLAUCOMA SUSPECT OF BOTH EYES: Status: ACTIVE | Noted: 2019-03-07

## 2022-07-28 NOTE — CONSULT LETTER
[Dear  ___] : Dear ~LUISA, [Courtesy Letter:] : I had the pleasure of seeing your patient, [unfilled], in my office today. [Please see my note below.] : Please see my note below. [Consult Closing:] : Thank you very much for allowing me to participate in the care of this patient.  If you have any questions, please do not hesitate to contact me. [Sincerely,] : Sincerely, [DrHeaven  ___] : Dr. AGARWAL [FreeTextEntry2] : Dr. Alfonso Muñoz [FreeTextEntry3] : Eliseo Mtz M.D., PERLITA.RILEY., F.REID.S.ALIYAHRHeavenS.\[facility] Chief Colorectal Clinical Services, [facility] Subsequent Stages Histo Method Verbiage: Using a similar technique to that described above, a thin layer of tissue was removed from all areas where tumor was visible on the previous stage.  The tissue was again oriented, mapped, dyed, and processed as above.

## 2022-10-17 ENCOUNTER — APPOINTMENT (OUTPATIENT)
Dept: OPHTHALMOLOGY | Facility: CLINIC | Age: 72
End: 2022-10-17

## 2022-10-17 ENCOUNTER — NON-APPOINTMENT (OUTPATIENT)
Age: 72
End: 2022-10-17

## 2022-10-17 PROCEDURE — 92083 EXTENDED VISUAL FIELD XM: CPT

## 2022-10-17 PROCEDURE — 92012 INTRM OPH EXAM EST PATIENT: CPT

## 2022-10-17 PROCEDURE — 92133 CPTRZD OPH DX IMG PST SGM ON: CPT

## 2023-04-17 ENCOUNTER — NON-APPOINTMENT (OUTPATIENT)
Age: 73
End: 2023-04-17

## 2023-04-17 ENCOUNTER — APPOINTMENT (OUTPATIENT)
Dept: OPHTHALMOLOGY | Facility: CLINIC | Age: 73
End: 2023-04-17
Payer: MEDICARE

## 2023-04-17 PROCEDURE — 92250 FUNDUS PHOTOGRAPHY W/I&R: CPT

## 2023-04-17 PROCEDURE — 92014 COMPRE OPH EXAM EST PT 1/>: CPT

## 2023-04-17 PROCEDURE — 92083 EXTENDED VISUAL FIELD XM: CPT

## 2023-04-17 PROCEDURE — 92136 OPHTHALMIC BIOMETRY: CPT

## 2023-08-21 ENCOUNTER — NON-APPOINTMENT (OUTPATIENT)
Age: 73
End: 2023-08-21

## 2023-08-21 ENCOUNTER — APPOINTMENT (OUTPATIENT)
Dept: OPHTHALMOLOGY | Facility: CLINIC | Age: 73
End: 2023-08-21
Payer: MEDICARE

## 2023-08-21 PROCEDURE — 92012 INTRM OPH EXAM EST PATIENT: CPT

## 2023-10-06 ENCOUNTER — NON-APPOINTMENT (OUTPATIENT)
Age: 73
End: 2023-10-06

## 2023-10-06 ENCOUNTER — APPOINTMENT (OUTPATIENT)
Dept: OPHTHALMOLOGY | Facility: CLINIC | Age: 73
End: 2023-10-06
Payer: MEDICARE

## 2023-10-06 PROCEDURE — 92133 CPTRZD OPH DX IMG PST SGM ON: CPT

## 2023-10-06 PROCEDURE — 92014 COMPRE OPH EXAM EST PT 1/>: CPT

## 2023-10-06 PROCEDURE — 92060 SENSORIMOTOR EXAMINATION: CPT

## 2023-10-16 ENCOUNTER — APPOINTMENT (OUTPATIENT)
Dept: OPHTHALMOLOGY | Facility: CLINIC | Age: 73
End: 2023-10-16

## 2023-12-06 ENCOUNTER — NON-APPOINTMENT (OUTPATIENT)
Age: 73
End: 2023-12-06

## 2023-12-06 ENCOUNTER — APPOINTMENT (OUTPATIENT)
Dept: OPHTHALMOLOGY | Facility: CLINIC | Age: 73
End: 2023-12-06
Payer: MEDICARE

## 2023-12-06 PROCEDURE — 92012 INTRM OPH EXAM EST PATIENT: CPT

## 2024-01-19 ENCOUNTER — APPOINTMENT (OUTPATIENT)
Dept: OPHTHALMOLOGY | Facility: CLINIC | Age: 74
End: 2024-01-19

## 2024-01-29 NOTE — ASU PATIENT PROFILE, ADULT - NS PREOP UNDERSTANDS INFO
No solid food/dairy/candy/gum after 05:00am tomorrow; water allowed before 11:30am tomorrow; patient reminded to come with photo ID/insurance/credit card; dress in comfortable clothes; no jewelries/contact lens/valuable; no smoking/alcohol drinking/recreational drug use today; escort to have photo ID; address and callback number was given;/yes

## 2024-01-30 ENCOUNTER — NON-APPOINTMENT (OUTPATIENT)
Age: 74
End: 2024-01-30

## 2024-01-30 ENCOUNTER — APPOINTMENT (OUTPATIENT)
Dept: OPHTHALMOLOGY | Facility: AMBULATORY SURGERY CENTER | Age: 74
End: 2024-01-30

## 2024-01-30 ENCOUNTER — OUTPATIENT (OUTPATIENT)
Dept: OUTPATIENT SERVICES | Facility: HOSPITAL | Age: 74
LOS: 1 days | Discharge: HOME | End: 2024-01-30
Payer: MEDICARE

## 2024-01-30 VITALS
OXYGEN SATURATION: 96 % | DIASTOLIC BLOOD PRESSURE: 78 MMHG | HEIGHT: 72 IN | TEMPERATURE: 98 F | RESPIRATION RATE: 16 BRPM | HEART RATE: 78 BPM | WEIGHT: 171.96 LBS | SYSTOLIC BLOOD PRESSURE: 144 MMHG

## 2024-01-30 VITALS
DIASTOLIC BLOOD PRESSURE: 84 MMHG | TEMPERATURE: 97 F | RESPIRATION RATE: 16 BRPM | HEART RATE: 63 BPM | SYSTOLIC BLOOD PRESSURE: 137 MMHG

## 2024-01-30 DIAGNOSIS — Z90.79 ACQUIRED ABSENCE OF OTHER GENITAL ORGAN(S): Chronic | ICD-10-CM

## 2024-01-30 PROCEDURE — 66984 XCAPSL CTRC RMVL W/O ECP: CPT | Mod: LT

## 2024-01-30 DEVICE — LENS IOL TECNIS PROTEC ZCB00 18.5D
Type: IMPLANTABLE DEVICE | Site: LEFT | Status: NON-FUNCTIONAL
Removed: 2024-01-30

## 2024-01-30 RX ORDER — PHENYLEPHRINE HCL 2.5 %
1 DROPS OPHTHALMIC (EYE)
Refills: 0 | Status: DISCONTINUED | OUTPATIENT
Start: 2024-01-30 | End: 2024-01-30

## 2024-01-30 RX ORDER — CYCLOPENTOLATE HYDROCHLORIDE 10 MG/ML
1 SOLUTION/ DROPS OPHTHALMIC
Refills: 0 | Status: DISCONTINUED | OUTPATIENT
Start: 2024-01-30 | End: 2024-01-30

## 2024-01-30 RX ORDER — SODIUM CHLORIDE 9 MG/ML
500 INJECTION, SOLUTION INTRAVENOUS
Refills: 0 | Status: DISCONTINUED | OUTPATIENT
Start: 2024-01-30 | End: 2024-01-30

## 2024-01-30 RX ORDER — ACETAMINOPHEN 500 MG
500 TABLET ORAL ONCE
Refills: 0 | Status: COMPLETED | OUTPATIENT
Start: 2024-01-30 | End: 2024-01-30

## 2024-01-30 RX ORDER — FENTANYL CITRATE 50 UG/ML
25 INJECTION INTRAVENOUS
Refills: 0 | Status: DISCONTINUED | OUTPATIENT
Start: 2024-01-30 | End: 2024-01-30

## 2024-01-30 RX ORDER — TROPICAMIDE 1 %
1 DROPS OPHTHALMIC (EYE)
Refills: 0 | Status: DISCONTINUED | OUTPATIENT
Start: 2024-01-30 | End: 2024-01-30

## 2024-01-30 RX ORDER — OFLOXACIN 0.3 %
1 DROPS OPHTHALMIC (EYE)
Refills: 0 | Status: DISCONTINUED | OUTPATIENT
Start: 2024-01-30 | End: 2024-01-30

## 2024-01-30 RX ADMIN — Medication 500 MILLIGRAM(S): at 15:28

## 2024-01-30 NOTE — OPERATIVE REPORT - OPERATIVE RPOSRT DETAILS
DATE OF SURGERY: 01/30/2024    OPERATING SURGEON: BHARTI COTTON D.O.     ASSISTANT SURGEON: OLLIE GARCIA D.O.    ANESTHESIA: MONITORED ANESTHESIA CARE & TOPICAL.    PREOPERATIVE DIAGNOSIS: CATARACT, LEFT EYE.     POSTOPERATIVE DIAGNOSIS: CATARACT, LEFT EYE.     OPERATIVE PROCEDURE: CATARACT EXTRACTION, INTRAOCULAR LENS IMPLANTATION, LEFT EYE     COMPLICATIONS: NONE.    SPECIMEN: NONE.    ESTIMATED BLOOD LOSS: <1 cc    PATIENT CONDITION: STABLE.    PROCEDURE:  Prior to the procedure, all risks, benefits and alternatives were discussed with the patient, including but not limited to infection, bleeding, retinal detachment, increase or decrease in intraocular pressure, corneal edema, corneal decompensation, ptosis, diplopia, loss of vision, no improvement of vision, need for second surgery, macular edema, intraocular inflammation, etc. All questions were answered and the patient wished to proceed with the surgery. Informed consent was obtained.    The patient was wheeled to the operating room and placed on the operating table in a supine position. Next, the left eye was prepped and draped in the usual sterile fashion for intraocular surgery. An eyelid speculum was placed into the left eye.    Inferotemporal and superotemporal paracenteses were created using a 20 gauge MVR blade. Trypan Blue was used to stain the anterior capsule, followed by injection of Viscoat into the anterior chamber. A temporal biplanar clear corneal incision was then created using a 2.4-mm keratome. Next, a continuous curvilinear capsulorrhexis was created with a 27 gauge needle cystotome and Utrata forceps. Balanced salt solution hydrodissection and hydrodelineation were performed. The lens was phacoemulsified using a divide-and-conquer technique. Residual epinucleus and cortical material were removed using a bimanual-handpiece irrigation & aspiration device.    Healon was injected into the bag. A 18.5-diopter SHAHID Tecnis ZCB00 lens was inserted into the capsular bag and rotated into place. The viscoelastic was then removed using irrigation and aspiration. The wounds were hydrated.     At the end of the procedure, the anterior chamber was well formed. The intraocular pressure was in the mid-teens by palpation. All wounds were water-tight with no leakage. Antibiotic and dexamethasone were applied on the cornea and conjunctiva. The eyelid speculum was removed. Topical antibiotic and steroid ointment was placed onto the left eye, which was then patched and shielded. The patient was wheeled to the recovery room in a stable and excellent condition.

## 2024-01-30 NOTE — ASU PREOP CHECKLIST - NOTHING BY MOUTH SINCE
Detail Level: Zone
Note Text (......Xxx Chief Complaint.): This diagnosis correlates with the
30-Jan-2024 08:00

## 2024-01-31 ENCOUNTER — NON-APPOINTMENT (OUTPATIENT)
Age: 74
End: 2024-01-31

## 2024-01-31 ENCOUNTER — APPOINTMENT (OUTPATIENT)
Dept: OPHTHALMOLOGY | Facility: CLINIC | Age: 74
End: 2024-01-31
Payer: MEDICARE

## 2024-01-31 PROBLEM — C61 MALIGNANT NEOPLASM OF PROSTATE: Chronic | Status: ACTIVE | Noted: 2024-01-29

## 2024-01-31 PROCEDURE — 99024 POSTOP FOLLOW-UP VISIT: CPT

## 2024-02-06 ENCOUNTER — APPOINTMENT (OUTPATIENT)
Dept: OPHTHALMOLOGY | Facility: CLINIC | Age: 74
End: 2024-02-06
Payer: MEDICARE

## 2024-02-06 ENCOUNTER — NON-APPOINTMENT (OUTPATIENT)
Age: 74
End: 2024-02-06

## 2024-02-06 PROCEDURE — 99024 POSTOP FOLLOW-UP VISIT: CPT

## 2024-02-27 ENCOUNTER — NON-APPOINTMENT (OUTPATIENT)
Age: 74
End: 2024-02-27

## 2024-02-27 ENCOUNTER — APPOINTMENT (OUTPATIENT)
Dept: OPHTHALMOLOGY | Facility: CLINIC | Age: 74
End: 2024-02-27
Payer: MEDICARE

## 2024-02-27 PROCEDURE — 99024 POSTOP FOLLOW-UP VISIT: CPT

## 2024-02-28 ENCOUNTER — TRANSCRIPTION ENCOUNTER (OUTPATIENT)
Age: 74
End: 2024-02-28

## 2024-04-11 NOTE — VITALS
RIGHT SHOULDER SCOPE ROTATORCUFF REPAIR (R) Operative Note     Date: 2024  OR Location: [facility]    Name: Van Hayes, : 1959, Age: 64 y.o., MRN: [id number], Sex: male    Diagnosis  Pre-op Diagnosis     * Complete tear of right rotator cuff, unspecified whether traumatic [M75.121] Post-op Diagnosis     * Complete tear of right rotator cuff, unspecified whether traumatic [M75.121]     Procedures  RIGHT SHOULDER SCOPE ROTATORCUFF REPAIR  40051 - [place] SURGICAL ARTHROSCOPY SHOULDER W/ROTATOR CUFF RPR      Surgeons      * Santhosh Arvizu - Primary    Resident/Fellow/Other Assistant:  Surgeons and Role:  * No surgeons found with a matching role *    Procedure Summary  Anesthesia: General  ASA: III  Anesthesia Staff: Anesthesiologist: Amrik Ramirez DO  C-AA: ADONIS Taylor  Estimated Blood Loss: 10 massive rotator cuff tear entire supra and infraspinatus retracted about 2 inches.  mL  Intra-op Medications:   Administrations occurring from 0830 to 1000 on 24:   Medication Name Total Dose   lactated Ringer's infusion Cannot be calculated              Anesthesia Record               Intraprocedure I/O Totals          Intake    lactated Ringer's infusion 450.00 mL    Total Intake 450 mL       Output    Est. Blood Loss 5 mL    Total Output 5 mL       Net    Net Volume 445 mL          Specimen: No specimens collected     Staff:   Circulator: Kendra Tineo RN  Relief Circulator: Sheila Jimenez RN  Scrub Person: Blossom Patel; Marilu Shepard         Drains and/or Catheters: * None in log *    Tourniquet Times:         Implants:  Implants       Type Name Action Serial No.      Implant SUTURE ANCHOR, Y-KNOT PRO RC, TWO P2 HI-FI - [id number] Implanted      Implant SUTURE ANCHOR, Y-KNOT PRO RC, TWO P2 HI-FI - [id number] Implanted      Implant SUTURE ANCHOR, Y-KNOT PRO RC, TWO P2 HI-FI - [id number] - [id number] Implanted [id number]              Findings: Massive rotator cuff tear retracted, intact biceps tendon  intact biceps anchor, intact glenohumeral articular cartilage and labrum    Indications: Van Hayes is an 64 y.o. male who is having surgery for Complete tear of right rotator cuff, unspecified whether traumatic [M75.121].  Patient has a massive rotator cuff tear with severe pain disability.  Right upper extremity has been counseled about options for treatment including surgical nonsurgical potential benefits possible risks alternatives and rehabilitation sequence were explained patient did request and schedule surgery provided informed consent, obtained on the date of the Procedure    The patient was seen in the preoperative area. The risks, benefits, complications, treatment options, non-operative alternatives, expected recovery and outcomes were discussed with the patient. The possibilities of reaction to medication, pulmonary aspiration, injury to surrounding structures, bleeding, recurrent infection, the need for additional procedures, failure to diagnose a condition, and creating a complication requiring transfusion or operation were discussed with the patient. The patient concurred with the proposed plan, giving informed consent.  The site of surgery was properly noted/marked if necessary per policy. The patient has been actively warmed in preoperative area. Preoperative antibiotics have been ordered and given within 1 hours of incision. Venous thrombosis prophylaxis have been ordered including bilateral sequential compression devices    Procedure Details: Anesthesia was consulted for postoperative pain management preoperatively established an interscalene nerve block on the right shoulder.  The patient was then transferred to the operating room placed supine on the operating table a timeout was called the marked site confirmed patient identification confirmed procedure reviewed allergies reviewed antibiotics were administered.  General endotracheal anesthesia staff successfully patient moved to  semisitting position all bony planes.  Hepatic symmetric patient presents presents legs in the right upper EXTR sterilely prepped and draped diagnostic arthroscopy performed    Findings above the capsular release was performed above the superior glenoid labrum and then bursal releases were performed exposing the rotator cuff which was mobilized successfully the insertion site was prepared with synovial shaver and cautery.  Suture anchor was placed proximally and horizontal mattress configuration sutures were used to reduce the retracted cuff tear and provide proximal row fixation.  Then a second anchor was placed anteriorly and the sutures were passed in the MCT suture figuration third anchor was placed posteriorly and 2 sutures were passed in simple configuration all sutures were tied achieving excellent restoration of the cuff to the prepared bed incisions were then closed with observable nonabsorbable sutures and sterile dressings were applied patient's arm was placed in a sling and he was awakened and transferred to recovery.  There were no complications.  Complications:  None; patient tolerated the procedure well.    Disposition: PACU - hemodynamically stable.  Condition: stable         Additional Details: None    Attending Attestation:     Santhosh Arvizu  Phone Number: [phone]       [Maximal Pain Intensity: 0/10] : 0/10 [Least Pain Intensity: 0/10] : 0/10 [90: Able to carry normal activity; minor signs or symptoms of disease.] : 90: Able to carry normal activity; minor signs or symptoms of disease.  [ECOG Performance Status: 0 - Fully active, able to carry on all pre-disease performance without restriction] : Performance Status: 0 - Fully active, able to carry on all pre-disease performance without restriction

## 2024-04-22 ENCOUNTER — APPOINTMENT (OUTPATIENT)
Dept: OPHTHALMOLOGY | Facility: CLINIC | Age: 74
End: 2024-04-22
Payer: MEDICARE

## 2024-04-22 ENCOUNTER — NON-APPOINTMENT (OUTPATIENT)
Age: 74
End: 2024-04-22

## 2024-04-22 PROCEDURE — 99024 POSTOP FOLLOW-UP VISIT: CPT

## 2024-05-24 ENCOUNTER — APPOINTMENT (OUTPATIENT)
Dept: OPHTHALMOLOGY | Facility: CLINIC | Age: 74
End: 2024-05-24
Payer: SELF-PAY

## 2024-05-24 ENCOUNTER — NON-APPOINTMENT (OUTPATIENT)
Age: 74
End: 2024-05-24

## 2024-05-24 PROCEDURE — 92015 DETERMINE REFRACTIVE STATE: CPT

## 2024-10-21 ENCOUNTER — APPOINTMENT (OUTPATIENT)
Dept: OPHTHALMOLOGY | Facility: CLINIC | Age: 74
End: 2024-10-21

## 2024-12-28 NOTE — ASU PATIENT PROFILE, ADULT - ABILITY TO HEAR (WITH HEARING AID OR HEARING APPLIANCE IF NORMALLY USED):
Adequate: hears normal conversation without difficulty [facility] [place]  223/1  PROGRESS NOTE   Patient: Tommy Londono  Today's Date: 12/27/2024    YOB: 1946  Admission Date: 12/24/2024    MRN: [id number]  Inpatient LOS: 1    Attending: John Cardoso MD  Hospital Day: Hospital Day: 4    Subjective   HISTORY AND SUBJECTIVE COMPLAINTS     Chief Complaint:   Shortness of breath    Interval History / Subjective:   Resting in bed, no acute concerns.  Respiratory status at baseline.  Patient is on room air.  Discussed plan of care, will continue to optimize glucose control, prior to discharge.  aim will be less than 250.      Hospital Course:  Tommy Londono is a 78 year old presenting to the emergency department with pmh COPD, CHF, type II diabetes, Atrial fibrillation anticoagulated on warfarin, who presents with concerns for shortness of breath, cough, and nausea for the past three days.     ROS:  Pertinent systems negative except as above.    Objective   PHYSICAL EXAMINATION     Vital 24 Hour Range Most Recent Value   Temperature Temp  Min: 97.5 °F (36.4 °C)  Max: 98.8 °F (37.1 °C) 98.8 °F (37.1 °C)   Pulse Pulse  Min: 66  Max: 95 67   Respiratory Resp  Min: 16  Max: 18 16   Blood Pressure BP  Min: 139/76  Max: 170/81 139/76   Pulse Oximetry SpO2  Min: 94 %  Max: 97 % 96 %   Arterial BP No data recorded     O2 No data recorded       Recorded Intake and Output:  Intake/Output Summary (Last 24 hours) at 12/27/2024 1840  Last data filed at 12/27/2024 1828  Gross per 24 hour   Intake 1230 ml   Output 1250 ml   Net -20 ml      Recorded Last Stool Occurrence: 1 (12/26/24 0619)     Vital Most Recent Value First Value   Weight (!) 140.4 kg (309 lb 8.4 oz) Weight: (!) 146 kg (321 lb 14 oz)   Height 5' 6\" (167.6 cm) Height: 5' 6\" (167.6 cm)   BMI 49.96 N/A     GENERAL: Alert, cooperative, oriented x 3. Obese. Appears chronically ill.  LUNGS:   Crackles bilateral lower bases posteriorly. No wheezes or rhonchi. On room air. No accessory muscle use,  non-labored respirations.  HEART: Irregularly irregular. no palpable heaves or thrills, S1 and S2 are normal, no S3 or S4, no murmurs or extra heart sounds.  ABDOMEN/:  Softly distended, rounded, bowel sounds present and active in all 4 quadrants.  No masses, no hepatosplenomegaly.  EXTREMITIES:  Normal, atraumatic.  2+ edema noted to bilat extremities.   PULSES:  2+ and symmetric in all extremities.  SKIN:  brownish discoloration from PVD noted to bilat LEs  NEUROLOGIC:  Cranial nerves 2-12 grossly intact but not individually tested.    PSYCHIATRIC: Cooperative, normal memory. Appropriate mood and affect.    TEST RESULTS     Labs: The Laboratory values listed below have been reviewed and pertinent findings discussed in the Assessment and Plan.    Laboratory values:   Recent Labs   Lab 12/27/24  0530 12/26/24  0521 12/25/24  0556   WBC 4.9 7.0 9.0   HGB 10.8* 10.9* 11.9*   HCT 34.8* 34.2* 37.7*   * 128* 125*         Recent Labs   Lab 12/27/24  0530 12/26/24  0521 12/25/24  0556   SODIUM 144 143 142   POTASSIUM 4.4 4.4 3.6   CHLORIDE 104 104 103   CO2 30 31 28   CALCIUM 8.9 9.2 8.5   GLUCOSE 363* 308* 350*   BUN 47* 42* 27*   CREATININE 1.50* 1.67* 1.54*   MG 2.5* 2.4 2.1        Recent Labs   Lab 12/24/24  1528   ALBUMIN 2.8*   AST 21   GPT 16   BILIRUBIN 1.6*     Recent Labs   Lab 12/25/24  0556 12/24/24  1528   PCT 17.40* 7.33*     Recent Labs   Lab 12/26/24 2012 12/26/24  2245 12/27/24  0704 12/27/24  0854 12/27/24  1144 12/27/24  1633   GLUCOSE BEDSIDE 368* 334* 360* 443* 381* 276*       @covidlabs@  Recent Labs   Lab 12/26/24  0521 12/24/24  1528   HTROPI  --  75   NTPROB 2,571* 1,998*         Radiology: Imaging studies have been reviewed and pertinent findings discussed in the Assessment and Plan.  No results found for any visits on 12/24/24 (from the past 48 hour(s)).       ANCILLARY ORDERS     Diet:  Consistent Carb Moderate (45-75 Gm/Meal), Cardiac; Yes, Medical Nutrition Management by Ismael  (Registered Dietitian); Fluid Restrict 1800 Ml (1020 From Dietary) Diet  Telemetry: On  Consults:    PHARMACY TO DOSE AND MONITOR WARFARIN  IP CONSULT TO DIABETES EDUCATOR  Therapy Orders:   PT and OT Orders Placed this Encounter   Procedures    Occupational Therapy Evaluation    Occupational Therapy Treatment    Physical Therapy Evaluation    Physical Therapy Treatment       ADVANCED DIRECTIVES     Code Status: Full Resuscitation         ASSESSMENT AND PLAN     Acute hypoxic resp failure-  Acute s. pneumoniae bacteremia-  COPD exacerbation-  CAP -  Presented with non-productive cough, wheezing, HOLLIS for the past few days.  Was hypoxic in ED requiring supplemental oxygen, now weaned off oxygen.  Negative for influenza, COVID, RSV. Respiratory pathogen panel negative  severely elevated procal (7.33 > 17.40); Lactic acid normal   s.pnuemo, legionella urine antigens negative.   Initial blood cultures resulted positive for gram positive cocci (streptococcus pneumoniae). Monitor final results.  Repeat cultures obtained 12/25. No growth x1 day. Monitor results.  Sputum culture results positive for few gram positive cocci. Monitor final results.  Echo obtained, no evidence of vegetation.  Was requiring oxygen, now weaned off. Oxygen PRN  Continue Azithromycin 500 IV mg daily; Rocephin 2,000 mg IV daily  Budesonide 0.5mg daily nebulizer. Duonebs 4x daily.  Was on Solu-medrol 40mg BID, will adjust to prednisone 40mg oral daily.  Repeat blood cultures, no growth to date, continue ceftriaxone,, and await culture sensitivities    Acute CHF exacerbation-  Chronic diastolic heart failure -  Follows with Cardiology (Dr. Mcbride) as outpatient. Last visit 9/27/24  NT pro-BNP 1,998  CXR showed pleural effusion/congestion  Edema noted to BLE; compression stockings ordered  Last echo, limited (2021) with EF 65%. Moderate right atrial dilatation and pressure elevation. No significant valve abnormalities   Repeat echo ordered, shows EF  of 60%, with moderate pulmonary hypertension, and dilated IVC  Transition from IV diuretics, to his home dose, given significant improvement in CHF exacerbation.  Weight is at baseline. Will transition back to oral home dose lasix.   Strict I/O, Daily weights, cardiac diet.     Stage 3 CKD-  Creatinine at baseline  Monitor renal function.     Atrial Fibrillation-  EKG in ED showed NSR with RBBB per ED note  On Coumadin; INR therapeutic. Monitor INR  Continue Coumadin with pharmacy to dose     Nonsustained V. tach  - Patient asymptomatic  - Check troponin, and EKG  - Continue telemetry  - If persistent, will discuss with cardiology/EP    essential hypertension-  -Continue enalapril    T2DM-  A1C 6.6 on 10/23/24  Hold ozempic while hospitalized  -Diabetes educator consulted, will continue to titrate Lantus, increase scheduled mealtime     YO-  cpap at bedtime     Smoking status: Former Tobacco User    Nutrition status: Does Not Meet Criteria for Malnutrition   Body mass index is 49.96 kg/m². - Patient is morbidly obese with BMI >40  DVT Prophylaxis: Coumadin       DISCHARGE PLANNING     The patient's treatment plans were discussed with patient and RN.    Discharge Planning    Barriers to discharge: Patient is not medically ready and needs to remain in the hospital today due to medical necessity as documented above.   Anticipated discharge destination: Home  Expected Discharge Date: 12/28/2024                John Cardoso MD, MDHospitalist

## 2025-04-10 ENCOUNTER — NON-APPOINTMENT (OUTPATIENT)
Age: 75
End: 2025-04-10

## 2025-04-10 ENCOUNTER — APPOINTMENT (OUTPATIENT)
Dept: OPHTHALMOLOGY | Facility: CLINIC | Age: 75
End: 2025-04-10
Payer: MEDICARE

## 2025-04-10 PROCEDURE — 92014 COMPRE OPH EXAM EST PT 1/>: CPT | Mod: 25

## 2025-04-10 PROCEDURE — 92133 CPTRZD OPH DX IMG PST SGM ON: CPT

## 2025-04-10 PROCEDURE — 92136 OPHTHALMIC BIOMETRY: CPT

## 2025-04-10 PROCEDURE — 92025 CPTRIZED CORNEAL TOPOGRAPHY: CPT

## (undated) DEVICE — NUCLEUS HYDRODISSECTOR PEARCE ANGLED 25G X 22MM

## (undated) DEVICE — VENODYNE/SCD SLEEVE CALF MEDIUM

## (undated) DEVICE — SUT ETHILON 10-0 12" CS160-6

## (undated) DEVICE — CANNULA IRR ANT CHAMBER 30G

## (undated) DEVICE — DRAPE MICROSCOPE KNOB COVER SMALL (2 PCS)

## (undated) DEVICE — CARTRIDGE PLATINUM

## (undated) DEVICE — GLV 6.5 PROTEXIS (BLUE)

## (undated) DEVICE — KIT CENTURION ANTERIOR

## (undated) DEVICE — KNIFE ALCON MVR V-LANCE 20G (WHITE)

## (undated) DEVICE — SOL IRR BAG BSS 500ML

## (undated) DEVICE — CANNULA IRR ALCON ANTERIOR CHAMBER 30G

## (undated) DEVICE — PACK ANTERIOR SEGMENT

## (undated) DEVICE — APPLICATOR COTTON TIP 3" STERILE

## (undated) DEVICE — WARMING BLANKET LOWER ADULT

## (undated) DEVICE — PACK CENTURION 2.4MM